# Patient Record
Sex: FEMALE | Race: WHITE | Employment: OTHER | URBAN - METROPOLITAN AREA
[De-identification: names, ages, dates, MRNs, and addresses within clinical notes are randomized per-mention and may not be internally consistent; named-entity substitution may affect disease eponyms.]

---

## 2017-02-07 ENCOUNTER — TELEPHONE (OUTPATIENT)
Dept: INTERNAL MEDICINE CLINIC | Facility: CLINIC | Age: 77
End: 2017-02-07

## 2017-02-07 DIAGNOSIS — E05.90 HYPERTHYROIDISM: ICD-10-CM

## 2017-02-07 DIAGNOSIS — E55.9 VITAMIN D DEFICIENCY: Primary | ICD-10-CM

## 2017-02-07 DIAGNOSIS — Z00.00 ANNUAL PHYSICAL EXAM: ICD-10-CM

## 2017-02-07 NOTE — TELEPHONE ENCOUNTER
Pt has an kobi for Medicare Annual on 2/28 she would like to know if she needs labs, if so please please mail an order to her home she will be using [employer].  Please call pt and let her know [phone]    Tasked to nursing

## 2017-02-07 NOTE — TELEPHONE ENCOUNTER
Patient has medicare annual physical scheduled for 2/28/17. Labs pended per protocol for [facility]. Patient would like labs mailed to her and also a call back.     To Dr Alana Caban, please advise on labs

## 2017-02-07 NOTE — TELEPHONE ENCOUNTER
Lab order completed, nursing please call patient, let them know to complete the labs 12 hours fasting, to be done 7 days prior to their visit of possible    Printed copy in my outbox

## 2017-02-07 NOTE — TELEPHONE ENCOUNTER
Pt notified lab orders to be mailed to pt  For [facility] labs   To be done 7 days prior to kobi fasting needed - pt understands instructions

## 2017-02-18 ENCOUNTER — PRIOR ORIGINAL RECORDS (OUTPATIENT)
Dept: OTHER | Age: 77
End: 2017-02-18

## 2017-02-20 LAB
ABSOLUTE BASOPHILS: 25 CELLS/UL (ref 0–200)
ABSOLUTE EOSINOPHILS: 172 CELLS/UL (ref 15–500)
ABSOLUTE LYMPHOCYTES: 1107 CELLS/UL (ref 850–3900)
ABSOLUTE MONOCYTES: 245 CELLS/UL (ref 200–950)
ABSOLUTE NEUTROPHILS: 3352 CELLS/UL (ref 1500–7800)
ALBUMIN/GLOBULIN RATIO: 1.6 (CALC) (ref 1–2.5)
ALBUMIN: 4.3 G/DL (ref 3.6–5.1)
ALKALINE PHOSPHATASE: 75 U/L (ref 33–130)
ALT: 12 U/L (ref 6–29)
APPEARANCE: CLEAR
AST: 15 U/L (ref 10–35)
BASOPHILS: 0.5 %
BILIRUBIN, TOTAL: 0.6 MG/DL (ref 0.2–1.2)
BILIRUBIN: NEGATIVE
BUN: 15 MG/DL (ref 7–25)
CALCIUM: 9.4 MG/DL (ref 8.6–10.4)
CARBON DIOXIDE: 29 MMOL/L (ref 20–31)
CHLORIDE: 108 MMOL/L (ref 98–110)
CHOL/HDLC RATIO: 2.9 (CALC)
CHOLESTEROL, TOTAL: 167 MG/DL (ref 125–200)
COLOR: YELLOW
CREATININE: 0.79 MG/DL (ref 0.6–0.93)
EGFR IF AFRICN AM: 84 ML/MIN/1.73M2
EGFR IF NONAFRICN AM: 73 ML/MIN/1.73M2
EOSINOPHILS: 3.5 %
GLOBULIN: 2.7 G/DL (CALC) (ref 1.9–3.7)
GLUCOSE: 89 MG/DL (ref 65–99)
GLUCOSE: NEGATIVE
HDL CHOLESTEROL: 57 MG/DL
HEMATOCRIT: 44.1 % (ref 35–45)
HEMOGLOBIN: 14.2 G/DL (ref 11.7–15.5)
KETONES: NEGATIVE
LDL-CHOLESTEROL: 89 MG/DL (CALC)
LEUKOCYTE ESTERASE: NEGATIVE
LYMPHOCYTES: 22.6 %
MCH: 29.6 PG (ref 27–33)
MCHC: 32.3 G/DL (ref 32–36)
MCV: 91.8 FL (ref 80–100)
MONOCYTES: 5 %
MPV: 9.6 FL (ref 7.5–12.5)
NEUTROPHILS: 68.4 %
NITRITE: NEGATIVE
NON-HDL CHOLESTEROL: 110 MG/DL (CALC)
OCCULT BLOOD: NEGATIVE
PH: 7 (ref 5–8)
PLATELET COUNT: 201 THOUSAND/UL (ref 140–400)
POTASSIUM: 4.5 MMOL/L (ref 3.5–5.3)
PROTEIN, TOTAL: 7 G/DL (ref 6.1–8.1)
PROTEIN: NEGATIVE
RDW: 15 % (ref 11–15)
RED BLOOD CELL COUNT: 4.8 MILLION/UL (ref 3.8–5.1)
SODIUM: 143 MMOL/L (ref 135–146)
SPECIFIC GRAVITY: 1.02 (ref 1–1.03)
TRIGLYCERIDES: 104 MG/DL
TSH: 2.11 MIU/L (ref 0.4–4.5)
VITAMIN D, 25-OH, TOTAL: 33 NG/ML (ref 30–100)
WHITE BLOOD CELL COUNT: 4.9 THOUSAND/UL (ref 3.8–10.8)

## 2017-02-21 ENCOUNTER — TELEPHONE (OUTPATIENT)
Dept: INTERNAL MEDICINE CLINIC | Facility: CLINIC | Age: 77
End: 2017-02-21

## 2017-02-21 NOTE — TELEPHONE ENCOUNTER
Patient called back. Relayed Dr Lance Penn message and specific values requested. She verbalized understanding. Offered to mail labs, but she states she can get them when she comes for appt at the end of this month.

## 2017-02-21 NOTE — TELEPHONE ENCOUNTER
Nursing please call patient, tell her I reviewed her recent lab work done, through [facility] [place], things look good here, vitamin D level looks better, everything else looks controlled, no changes in medication from me, follow-up with me as last discussed.

## 2017-02-28 ENCOUNTER — OFFICE VISIT (OUTPATIENT)
Dept: INTERNAL MEDICINE CLINIC | Facility: CLINIC | Age: 77
End: 2017-02-28

## 2017-02-28 VITALS
BODY MASS INDEX: 29.23 KG/M2 | HEART RATE: 83 BPM | TEMPERATURE: 98 F | OXYGEN SATURATION: 98 % | WEIGHT: 165 LBS | SYSTOLIC BLOOD PRESSURE: 152 MMHG | DIASTOLIC BLOOD PRESSURE: 86 MMHG | HEIGHT: 63 IN

## 2017-02-28 DIAGNOSIS — R19.8 ABNORMAL BOWEL MOVEMENT: ICD-10-CM

## 2017-02-28 DIAGNOSIS — Z86.19 HISTORY OF LYME DISEASE: ICD-10-CM

## 2017-02-28 DIAGNOSIS — L73.1 INGROWN HAIR: ICD-10-CM

## 2017-02-28 DIAGNOSIS — R09.89 LABILE BLOOD PRESSURE: ICD-10-CM

## 2017-02-28 DIAGNOSIS — E04.1 THYROID NODULE: ICD-10-CM

## 2017-02-28 DIAGNOSIS — J01.11 ACUTE RECURRENT FRONTAL SINUSITIS: ICD-10-CM

## 2017-02-28 DIAGNOSIS — Z00.00 PHYSICAL EXAM: Primary | ICD-10-CM

## 2017-02-28 DIAGNOSIS — E05.90 HYPERTHYROIDISM: ICD-10-CM

## 2017-02-28 DIAGNOSIS — Z12.31 ENCOUNTER FOR SCREENING MAMMOGRAM FOR HIGH-RISK PATIENT: ICD-10-CM

## 2017-02-28 DIAGNOSIS — Z87.442 HISTORY OF NEPHROLITHIASIS: ICD-10-CM

## 2017-02-28 DIAGNOSIS — Z00.00 ENCOUNTER FOR ANNUAL HEALTH EXAMINATION: ICD-10-CM

## 2017-02-28 PROCEDURE — G0439 PPPS, SUBSEQ VISIT: HCPCS | Performed by: INTERNAL MEDICINE

## 2017-02-28 PROCEDURE — G0463 HOSPITAL OUTPT CLINIC VISIT: HCPCS | Performed by: INTERNAL MEDICINE

## 2017-02-28 PROCEDURE — 93005 ELECTROCARDIOGRAM TRACING: CPT | Performed by: INTERNAL MEDICINE

## 2017-02-28 PROCEDURE — 99214 OFFICE O/P EST MOD 30 MIN: CPT | Performed by: INTERNAL MEDICINE

## 2017-02-28 PROCEDURE — 93010 ELECTROCARDIOGRAM REPORT: CPT | Performed by: INTERNAL MEDICINE

## 2017-02-28 RX ORDER — METOPROLOL SUCCINATE 25 MG/1
12.5 TABLET, EXTENDED RELEASE ORAL DAILY
Qty: 45 TABLET | Refills: 3 | Status: SHIPPED | OUTPATIENT
Start: 2017-02-28 | End: 2017-10-05

## 2017-02-28 RX ORDER — CEFADROXIL 500 MG/1
500 CAPSULE ORAL 2 TIMES DAILY
Qty: 14 CAPSULE | Refills: 0 | Status: SHIPPED | OUTPATIENT
Start: 2017-02-28 | End: 2017-03-27 | Stop reason: ALTCHOICE

## 2017-02-28 RX ORDER — SACCHAROMYCES BOULARDII 250 MG
250 CAPSULE ORAL 2 TIMES DAILY
Qty: 30 CAPSULE | Refills: 0 | Status: SHIPPED | OUTPATIENT
Start: 2017-02-28 | End: 2018-05-04

## 2017-02-28 RX ORDER — FLUTICASONE PROPIONATE 50 MCG
2 SPRAY, SUSPENSION (ML) NASAL 2 TIMES DAILY
Qty: 2 BOTTLE | Refills: 1 | Status: SHIPPED | OUTPATIENT
Start: 2017-02-28 | End: 2017-04-12

## 2017-02-28 NOTE — PATIENT INSTRUCTIONS
1. Physical exam  Physical exam instruction: Improve diet and exercise  - EKG In-Office [85901] Sinus rhythm at 72 bpm, no ST-T wave changes, normal EKG. 2. Thyroid nodule  Stable, continue medications and monitoring    3.  Hyperthyroidism  Stable, siobhan Directives    SeekAlumni.no. org/publications/Documents/personal_dec. pdf  An information packet, including necessary form from the [employer] [address] website. http://www. idph.state. [place]/public/books/advin.htm  A link to the [place]

## 2017-02-28 NOTE — PROGRESS NOTES
Goldy Briones is a 68year old female who presents for a Medicare Annual Wellness visit.     Patient presents with:  Physical: Patient presents for medicare annual physical. Needs order for mammogram, walking in the house for active, no exercise, in the green 0-No    Have you fallen in the last 12 months?: 0-No    Do you accidently lose urine?: 0-No    Do you have difficulty seeing?: 0-No    Do you have any difficulty walking or getting up?: 0-No    Do you have any tripping hazards?: 0-No    Are you on multiple OCCULTSTOOL No flowsheet data found. Glaucoma Screening      Ophthalmology Visit Annually 3 mo ago    Bone Density Screening      Dexascan Every two years No results found for this or any previous visit. No flowsheet data found.     Pap and Pelvic      P flowsheet data found. Dilated Eye exam  Annually No flowsheet data found. No flowsheet data found. COPD      Spirometry Testing Annually No results found for this or any previous visit. No flowsheet data found.       ALLERGIES:   No Known Allergies Negative Chest pain and irregular heartbeat/palpitations. GI: Negative for Abdominal pain, constipation, diarrhea, heartburn, nausea and vomiting. : Negative for Dysuria and urinary frequency.   Endocrine: Negative for Cold intolerance and heat intolera atraumatic, normocephalic, ears and throat are clear                Hearing Assessed via: Questionnaire  EYES: PERRLA, EOMI, conjunctiva are clear  Right Eye Visual Acuity: Corrected Left Eye Visual Acuity: Corrected   Right Eye Chart Acuity: 20/20 Left Ey 1    7. Encounter for annual health examination  Stable, continue medications and monitoring    8.  Labile blood pressure  Try the new medication, one half tablet, this should work well, continue to monitor, calling you with any side effects  - Metoprolol S Directives. The patient indicates understanding of these issues and agrees to the plan.   The patient is asked to return in 1 year for full physical.    SUGGESTED VACCINATIONS - Influenza, Pneumococcal, Zoster, Tetanus     Immunization History   Administ

## 2017-03-03 ENCOUNTER — TELEPHONE (OUTPATIENT)
Dept: INTERNAL MEDICINE CLINIC | Facility: CLINIC | Age: 77
End: 2017-03-03

## 2017-03-03 NOTE — TELEPHONE ENCOUNTER
Relayed to pt regarding abnormal mammogram and that [facility] will be calling her for additional views--pt verbalized understanding.  Order faxed back to [facility]

## 2017-03-03 NOTE — TELEPHONE ENCOUNTER
Mammogram reviewed, this is abnormal they are we calling her for additional views, nursing did speak with her after the physical copy of the mammogram was notated, and sent to nursing for phone call follow-up, brittaney to document

## 2017-03-10 ENCOUNTER — TELEPHONE (OUTPATIENT)
Dept: INTERNAL MEDICINE CLINIC | Facility: CLINIC | Age: 77
End: 2017-03-10

## 2017-03-10 DIAGNOSIS — R92.8 ABNORMAL MAMMOGRAM: Primary | ICD-10-CM

## 2017-03-10 NOTE — TELEPHONE ENCOUNTER
LMTCB---(No HIPAA info listed for leaving messages)  When patient calls back please mail order for diagnostic mammogram to her

## 2017-03-15 NOTE — TELEPHONE ENCOUNTER
Called patient and relayed DR. GILES message - verbalized understanding - order for diagnostic mammogram mailed to patients home

## 2017-03-27 ENCOUNTER — TELEPHONE (OUTPATIENT)
Dept: INTERNAL MEDICINE CLINIC | Facility: CLINIC | Age: 77
End: 2017-03-27

## 2017-03-27 RX ORDER — CEFUROXIME AXETIL 500 MG/1
500 TABLET ORAL 2 TIMES DAILY
Qty: 20 TABLET | Refills: 0 | Status: SHIPPED | OUTPATIENT
Start: 2017-03-27 | End: 2017-12-13

## 2017-03-27 NOTE — TELEPHONE ENCOUNTER
The nose spray prescribed on 2/28 helps for a couple of days & then it stops helping. Patient has headache, post nasal drip, cough from post nasal drip. Head congestion. No temp. Patient wants an antibiotic sent to [address] [place] [address] in [place].     She can be r

## 2017-04-10 ENCOUNTER — TELEPHONE (OUTPATIENT)
Dept: INTERNAL MEDICINE CLINIC | Facility: CLINIC | Age: 77
End: 2017-04-10

## 2017-04-10 DIAGNOSIS — J01.11 ACUTE RECURRENT FRONTAL SINUSITIS: Primary | ICD-10-CM

## 2017-04-10 NOTE — TELEPHONE ENCOUNTER
Patient needs a refill for:    Flonase     Send it to [address] (she had it recently filled at a local pharmacy)

## 2017-04-12 RX ORDER — FLUTICASONE PROPIONATE 50 MCG
2 SPRAY, SUSPENSION (ML) NASAL 2 TIMES DAILY
Qty: 3 BOTTLE | Refills: 0 | Status: SHIPPED | OUTPATIENT
Start: 2017-04-12 | End: 2018-05-10

## 2017-04-17 NOTE — TELEPHONE ENCOUNTER
The usual dose is 1 spray  In each nostril twice daily or 2 sprays in each once daily  Please advise different directions  To Rx and in purple folder

## 2017-04-21 NOTE — TELEPHONE ENCOUNTER
PA approved for Rx Fluticasone propionate spray  Effective till 12/31/2099  To Rx and sent to scanning

## 2017-09-11 ENCOUNTER — TELEPHONE (OUTPATIENT)
Dept: INTERNAL MEDICINE CLINIC | Facility: CLINIC | Age: 77
End: 2017-09-11

## 2017-09-11 NOTE — TELEPHONE ENCOUNTER
Nursing let her know, if she saw me in the office I would be sending her to see Dr. Sosa Ward anyway, so it is a great idea to see him, stay on the medication as she has, and he can give her the next option about what to do there.   Fax over whatever cardiac EKG

## 2017-09-11 NOTE — TELEPHONE ENCOUNTER
Patient called back. When she went to see Dr Augusto Street her thyroid doctor he had told her to wait a month and then try stopping the metoprolol to see if she had symptoms again.   She reports she stopped it around the 30th of August and she started having sympt

## 2017-09-11 NOTE — TELEPHONE ENCOUNTER
Re:  Patient's thyroid & labs with Dr. Nat Hester. Lab result were sent to us sometime after 9/8. Patient wants to talk to a nurse about her thyroid problem & what the doctor needs.

## 2017-09-15 NOTE — TELEPHONE ENCOUNTER
Called patient and relayed DR. GILES message - verbalized understanding . EKG and recent labs faxed to DR. León Monsivais at [phone]

## 2017-09-18 LAB
ALBUMIN: 4.3 G/DL
ALKALINE PHOSPHATATE(ALK PHOS): 75 IU/L
ALT (SGPT): 12 U/L
AST (SGOT): 15 U/L
BILIRUBIN TOTAL: 0.6 MG/DL
BUN: 15 MG/DL
CALCIUM: 9.4 MG/DL
CHLORIDE: 108 MEQ/L
CHOLESTEROL, TOTAL: 157 MG/DL
CREATININE, SERUM: 0.79 MG/DL
GLOBULIN: 2.7 G/DL
GLUCOSE: 89 MG/DL
GLUCOSE: 89 MG/DL
HDL CHOLESTEROL: 57 MG/DL
HEMATOCRIT: 44.1 %
HEMOGLOBIN: 14.2 G/DL
LDL CHOLESTEROL: 104 MG/DL
NON-HDL CHOLESTEROL: 110 MG/DL
PLATELETS: 201 K/UL
POTASSIUM, SERUM: 4.5 MEQ/L
PROTEIN, TOTAL: 7 G/DL
RED BLOOD COUNT: 4.8 X 10-6/U
SGOT (AST): 15 IU/L
SGPT (ALT): 12 IU/L
SODIUM: 143 MEQ/L
THYROID STIMULATING HORMONE: 2.11 MLU/L
TRIGLYCERIDES: 104 MG/DL
VITAMIN D 25-OH: 33 NG/ML
WHITE BLOOD COUNT: 4.9 X 10-3/U

## 2017-10-04 ENCOUNTER — MYAURORA ACCOUNT LINK (OUTPATIENT)
Dept: OTHER | Age: 77
End: 2017-10-04

## 2017-10-04 ENCOUNTER — TELEPHONE (OUTPATIENT)
Dept: INTERNAL MEDICINE CLINIC | Facility: CLINIC | Age: 77
End: 2017-10-04

## 2017-10-04 ENCOUNTER — PRIOR ORIGINAL RECORDS (OUTPATIENT)
Dept: OTHER | Age: 77
End: 2017-10-04

## 2017-10-04 DIAGNOSIS — R92.8 ABNORMAL MAMMOGRAM: Primary | ICD-10-CM

## 2017-10-04 NOTE — TELEPHONE ENCOUNTER
Received a phone call from Cristofer at [facility]. States patient presented for a repeat follow up mammogram with a written order.  Written order by Sai Walters stated bilateral mammogram. Brian Diaz states based on the patients last bilateral mammogram

## 2017-10-05 ENCOUNTER — TELEPHONE (OUTPATIENT)
Dept: INTERNAL MEDICINE CLINIC | Facility: CLINIC | Age: 77
End: 2017-10-05

## 2017-10-05 DIAGNOSIS — R09.89 LABILE BLOOD PRESSURE: ICD-10-CM

## 2017-10-05 RX ORDER — METOPROLOL SUCCINATE 25 MG/1
12.5 TABLET, EXTENDED RELEASE ORAL DAILY
Qty: 15 TABLET | Refills: 0 | Status: SHIPPED | OUTPATIENT
Start: 2017-10-05 | End: 2018-05-04

## 2017-10-05 NOTE — TELEPHONE ENCOUNTER
Toprol XL 25 mg, 1/2 a tablet a day, QTY-30. Please send to [facility] in [place]. Pt is out of medication. The mail order delayed the shipment.       Tasked low to Rx

## 2017-10-10 ENCOUNTER — TELEPHONE (OUTPATIENT)
Dept: INTERNAL MEDICINE CLINIC | Facility: CLINIC | Age: 77
End: 2017-10-10

## 2017-10-10 NOTE — TELEPHONE ENCOUNTER
Nursing please call patient, you can let her know I reviewed the mammogram results from [facility], things look good here, there does seem to be some mild cystic abnormalities but they seem very stable, the radiologist is not concerned recommends ro

## 2017-10-10 NOTE — TELEPHONE ENCOUNTER
I called patient and relayed Dr Hui Fernandez message to her.  Patient verbalized understanding but states that while at [facility] they told her to come back in 1 year initially but then said 6 months because the last time she had a bilateral mammogram

## 2017-10-17 NOTE — TELEPHONE ENCOUNTER
She should be performing the mammogram one year from the original screening mammogram, that may actually be 6 months from now. Tell her she is right. Also good luck with the upcoming testing with the cardiologist, everything sounds reasonable here.

## 2017-11-01 ENCOUNTER — PRIOR ORIGINAL RECORDS (OUTPATIENT)
Dept: OTHER | Age: 77
End: 2017-11-01

## 2017-11-08 ENCOUNTER — PRIOR ORIGINAL RECORDS (OUTPATIENT)
Dept: OTHER | Age: 77
End: 2017-11-08

## 2017-11-27 ENCOUNTER — PRIOR ORIGINAL RECORDS (OUTPATIENT)
Dept: OTHER | Age: 77
End: 2017-11-27

## 2017-11-27 ENCOUNTER — MYAURORA ACCOUNT LINK (OUTPATIENT)
Dept: OTHER | Age: 77
End: 2017-11-27

## 2017-12-13 ENCOUNTER — TELEPHONE (OUTPATIENT)
Dept: INTERNAL MEDICINE CLINIC | Facility: CLINIC | Age: 77
End: 2017-12-13

## 2017-12-13 RX ORDER — CEFUROXIME AXETIL 250 MG/1
250 TABLET ORAL 2 TIMES DAILY
Qty: 20 TABLET | Refills: 0 | Status: SHIPPED | OUTPATIENT
Start: 2017-12-13 | End: 2018-05-04 | Stop reason: ALTCHOICE

## 2017-12-13 NOTE — TELEPHONE ENCOUNTER
[phone]  Poss sinus inf. Sick for a week.  Pt would like Rx  [facility] in [place]  To clinical

## 2017-12-13 NOTE — TELEPHONE ENCOUNTER
Called patient and relayed Dr Macedo Pi message to her. She verbalized understanding. Upon sending [address] sent for Ceftin to [facility] was given high warning.      High  High Dose: Cefuroxime Axetil, 500 mg, Oral, 2 times daily   Single dose of 500 mg ex

## 2017-12-13 NOTE — TELEPHONE ENCOUNTER
Called patient for more information on her symptoms. She reports she has had symptoms for a week: Light headedness, pain in forehead, teeth, and nose. She coughs from the irritation in her throat. She has green/bloody nasal congestion.  She has not checked

## 2017-12-13 NOTE — TELEPHONE ENCOUNTER
To nursing, ok to refill the ceftin 500 mg bid #20 that Dr José Miguel Rahman prescribed in March of this yr. If not better, she will need to be seen. Thanks.

## 2018-03-05 ENCOUNTER — TELEPHONE (OUTPATIENT)
Dept: INTERNAL MEDICINE CLINIC | Facility: CLINIC | Age: 78
End: 2018-03-05

## 2018-03-05 DIAGNOSIS — R92.8 ABNORMAL MAMMOGRAM: Primary | ICD-10-CM

## 2018-03-06 NOTE — TELEPHONE ENCOUNTER
Left VM with pt to inform her that the Mammogram order is in the system. Told pt to please call back to schedule.

## 2018-03-06 NOTE — TELEPHONE ENCOUNTER
Pt would like the Mammogram order be for [facility], please mail to pt home    Tasked to nursing

## 2018-03-19 DIAGNOSIS — R09.89 LABILE BLOOD PRESSURE: ICD-10-CM

## 2018-03-19 RX ORDER — METOPROLOL SUCCINATE 25 MG
TABLET, EXTENDED RELEASE 24 HR ORAL
Qty: 45 TABLET | Refills: 0 | Status: SHIPPED | OUTPATIENT
Start: 2018-03-19 | End: 2018-06-17

## 2018-05-04 ENCOUNTER — OFFICE VISIT (OUTPATIENT)
Dept: INTERNAL MEDICINE CLINIC | Facility: CLINIC | Age: 78
End: 2018-05-04

## 2018-05-04 VITALS
RESPIRATION RATE: 18 BRPM | HEART RATE: 61 BPM | WEIGHT: 156 LBS | HEIGHT: 63 IN | DIASTOLIC BLOOD PRESSURE: 88 MMHG | BODY MASS INDEX: 27.64 KG/M2 | SYSTOLIC BLOOD PRESSURE: 148 MMHG | OXYGEN SATURATION: 98 % | TEMPERATURE: 98 F

## 2018-05-04 DIAGNOSIS — Z87.442 HISTORY OF NEPHROLITHIASIS: ICD-10-CM

## 2018-05-04 DIAGNOSIS — E04.1 THYROID NODULE: ICD-10-CM

## 2018-05-04 DIAGNOSIS — J01.11 ACUTE RECURRENT FRONTAL SINUSITIS: ICD-10-CM

## 2018-05-04 DIAGNOSIS — Z86.19 HISTORY OF LYME DISEASE: ICD-10-CM

## 2018-05-04 DIAGNOSIS — R19.8 ABNORMAL BOWEL MOVEMENT: ICD-10-CM

## 2018-05-04 DIAGNOSIS — Z00.00 PHYSICAL EXAM: Primary | ICD-10-CM

## 2018-05-04 DIAGNOSIS — Z00.00 ENCOUNTER FOR ANNUAL HEALTH EXAMINATION: ICD-10-CM

## 2018-05-04 DIAGNOSIS — E05.90 HYPERTHYROIDISM: ICD-10-CM

## 2018-05-04 DIAGNOSIS — E55.9 VITAMIN D DEFICIENCY: ICD-10-CM

## 2018-05-04 DIAGNOSIS — I10 ESSENTIAL HYPERTENSION: ICD-10-CM

## 2018-05-04 PROCEDURE — 99214 OFFICE O/P EST MOD 30 MIN: CPT | Performed by: INTERNAL MEDICINE

## 2018-05-04 PROCEDURE — G0463 HOSPITAL OUTPT CLINIC VISIT: HCPCS | Performed by: INTERNAL MEDICINE

## 2018-05-04 PROCEDURE — G0439 PPPS, SUBSEQ VISIT: HCPCS | Performed by: INTERNAL MEDICINE

## 2018-05-04 RX ORDER — SACCHAROMYCES BOULARDII 250 MG
250 CAPSULE ORAL 2 TIMES DAILY
Qty: 180 CAPSULE | Refills: 1 | Status: SHIPPED | OUTPATIENT
Start: 2018-05-04 | End: 2019-03-22

## 2018-05-04 NOTE — PROGRESS NOTES
Nancy Negron is a 68year old female who presents for a Medicare Annual Wellness visit.     Patient presents with:  Physical: Annual Medicare Px, may be leaving for a wedding in [place], stable and doing well, diet average and exercise is just activity 0          Depression Screening (PHQ-2/PHQ-9): Over the LAST 2 WEEKS   Little interest or pleasure in doing things (over the last two weeks)?: Not at all    Feeling down, depressed, or hopeless (over the last two weeks)?: Not at all    PHQ-2 SCORE: 0 for this patient. Update Health Maintenance if applicable    Chlamydia  Annually if high risk No results found for: CHLAMYDIA No flowsheet data found.     Screening Mammogram      Mammogram  Annually There are no preventive care reminders to display for [facility] needed. Disp:  Rfl:    POTASSIUM OR Take by mouth. Twice a week Disp:  Rfl:    saccharomyces boulardii 250 MG Oral Cap Take 1 capsule (250 mg total) by mouth 2 (two) times daily.  Disp: 180 capsule Rfl: 1   TOPROL XL 25 MG Oral Tablet 24 Hr TAKE ONE-HALF (1 disturbance and memory impairment. Psych: Negative for Anxiety and depression. Integumentary: Negative for Hives and rash. MS: Negative muscle weakness. pos for joint pain  Hema/Lymph: Negative Easy bleeding and easy bruising.   Allergic/Immuno: Negative OTHER RELEVANT CHRONIC CONDITIONS:   Maikol Mcfadden is a 68year old female who presents for a Medicare Assessment. PLAN SUMMARY:   Diagnoses and all orders for this visit:    Physical exam  -     CBC WITH DIFFERENTIAL WITH PLATELET;  Future  -     COMP verbalized understanding    Recommended Websites for Advanced Directives    SeekAlumni.no. org/publications/Documents/personal_dec. pdf  An information packet, including necessary form from the [employer] website. http://www. idph. st

## 2018-05-08 NOTE — TELEPHONE ENCOUNTER
Nursing please call patient, tell her that I reviewed the mammogram done on the fourth, everything looks good here, repeat in one year.

## 2018-05-10 DIAGNOSIS — J01.11 ACUTE RECURRENT FRONTAL SINUSITIS: ICD-10-CM

## 2018-05-11 RX ORDER — FLUTICASONE PROPIONATE 50 MCG
SPRAY, SUSPENSION (ML) NASAL
Qty: 3 BOTTLE | Refills: 3 | Status: SHIPPED | OUTPATIENT
Start: 2018-05-11 | End: 2019-07-23

## 2018-06-17 DIAGNOSIS — R09.89 LABILE BLOOD PRESSURE: ICD-10-CM

## 2018-06-17 RX ORDER — METOPROLOL SUCCINATE 25 MG
TABLET, EXTENDED RELEASE 24 HR ORAL
Qty: 45 TABLET | Refills: 0 | Status: SHIPPED | OUTPATIENT
Start: 2018-06-17 | End: 2018-08-30

## 2018-07-24 ENCOUNTER — TELEPHONE (OUTPATIENT)
Dept: INTERNAL MEDICINE CLINIC | Facility: CLINIC | Age: 78
End: 2018-07-24

## 2018-07-24 ENCOUNTER — LAB ENCOUNTER (OUTPATIENT)
Dept: LAB | Age: 78
End: 2018-07-24
Attending: INTERNAL MEDICINE
Payer: MEDICARE

## 2018-07-24 DIAGNOSIS — R30.0 DYSURIA: Primary | ICD-10-CM

## 2018-07-24 DIAGNOSIS — R35.0 URINARY FREQUENCY: ICD-10-CM

## 2018-07-24 LAB
BILIRUB UR QL: NEGATIVE
COLOR UR: YELLOW
GLUCOSE UR-MCNC: NEGATIVE MG/DL
KETONES UR-MCNC: NEGATIVE MG/DL
NITRITE UR QL STRIP.AUTO: NEGATIVE
PH UR: 5 [PH] (ref 5–8)
PROT UR-MCNC: 100 MG/DL
RBC #/AREA URNS AUTO: 461 /HPF
SP GR UR STRIP: 1.02 (ref 1–1.03)
UROBILINOGEN UR STRIP-ACNC: 2
VIT C UR-MCNC: 40 MG/DL
WBC #/AREA URNS AUTO: 737 /HPF

## 2018-07-24 PROCEDURE — 87086 URINE CULTURE/COLONY COUNT: CPT

## 2018-07-24 PROCEDURE — 81001 URINALYSIS AUTO W/SCOPE: CPT

## 2018-07-24 RX ORDER — NITROFURANTOIN 25; 75 MG/1; MG/1
100 CAPSULE ORAL 2 TIMES DAILY
Qty: 14 CAPSULE | Refills: 0 | Status: SHIPPED | OUTPATIENT
Start: 2018-07-24 | End: 2019-03-22 | Stop reason: ALTCHOICE

## 2018-07-24 NOTE — TELEPHONE ENCOUNTER
To Dr. Debby Hart - see below - Sx onset: yesterday - Sx: burning with frequent urination. Labs pended above.

## 2018-07-24 NOTE — TELEPHONE ENCOUNTER
Pt thinks she has a UTI, wondering if she can do a urine test in the lab to check for this. Pt is brining her  in today to see Dr GILES at 11:45. Was hoping to get it done at the same time. Best call back is [phone]. Tasked to nursing.

## 2018-07-26 ENCOUNTER — TELEPHONE (OUTPATIENT)
Dept: INTERNAL MEDICINE CLINIC | Facility: CLINIC | Age: 78
End: 2018-07-26

## 2018-07-27 NOTE — TELEPHONE ENCOUNTER
Nursing, please call patient, let her know that I did review the urine culture, there was no distinct infection here, but still I want her to complete all the antibiotics and follow-up with me as last discussed. Make sure symptomatic that she is better.

## 2018-07-27 NOTE — TELEPHONE ENCOUNTER
Routed to Dr. Eligio Mitchell as FYI     Pt voiced understanding to message relayed below. She reports the burning has subsided and she is feeling much better.

## 2018-08-30 ENCOUNTER — TELEPHONE (OUTPATIENT)
Dept: INTERNAL MEDICINE CLINIC | Facility: CLINIC | Age: 78
End: 2018-08-30

## 2018-08-30 DIAGNOSIS — R09.89 LABILE BLOOD PRESSURE: ICD-10-CM

## 2018-08-30 RX ORDER — METOPROLOL SUCCINATE 25 MG/1
25 TABLET, EXTENDED RELEASE ORAL DAILY
Qty: 30 TABLET | Refills: 0 | Status: SHIPPED | OUTPATIENT
Start: 2018-08-30 | End: 2018-09-26

## 2018-08-30 NOTE — TELEPHONE ENCOUNTER
Pt requesting refill Toprol XL 25 mg, pt said it was increased to 1 tab daily  Pt is completely out and needs temporary supply [facility]  Please send the remaining script to [facility]  Call pt when complete [phone]    Tasked to rx

## 2018-08-30 NOTE — TELEPHONE ENCOUNTER
To Dr. Eligio Mitchell - see below, pended above. Pt took last pill today. Did not see documentation of increase to 1 tablet daily, pt states was increased at last office visit. Pt would like 30 day supply to [facility], then long term fill to [facility].

## 2018-09-26 RX ORDER — METOPROLOL SUCCINATE 25 MG/1
25 TABLET, EXTENDED RELEASE ORAL DAILY
Qty: 30 TABLET | Refills: 0 | Status: SHIPPED | OUTPATIENT
Start: 2018-09-26 | End: 2019-01-23

## 2018-09-26 RX ORDER — METOPROLOL SUCCINATE 25 MG/1
25 TABLET, EXTENDED RELEASE ORAL DAILY
Qty: 90 TABLET | Refills: 3 | Status: SHIPPED | OUTPATIENT
Start: 2018-09-26 | End: 2020-10-22

## 2018-09-26 NOTE — TELEPHONE ENCOUNTER
[id number] Barbara Crandall one more time asking to please send a 30 day supplies to [address] [place] [address] for Toprol XL 25mg and once again please send a year supplies to [facility].  Pt stated this should have been done lest time this med was requested she like this done tod

## 2018-09-26 NOTE — TELEPHONE ENCOUNTER
Refill request is for a maintenance medication and has met the criteria specified in the Ambulatory Medication Refill Standing Order for eligibility, visits, laboratory, alerts and was sent to the requested pharmacy.   short term supply sent to local pharma

## 2018-10-25 ENCOUNTER — TELEPHONE (OUTPATIENT)
Dept: INTERNAL MEDICINE CLINIC | Facility: CLINIC | Age: 78
End: 2018-10-25

## 2018-10-25 NOTE — TELEPHONE ENCOUNTER
Pt has a question about the pneumococcal vaccine, wants to find out about the new shot with booster or is the new shot the same way as before?  Best call back is [phone]

## 2019-01-23 DIAGNOSIS — R09.89 LABILE BLOOD PRESSURE: ICD-10-CM

## 2019-01-23 RX ORDER — METOPROLOL SUCCINATE 25 MG/1
25 TABLET, EXTENDED RELEASE ORAL DAILY
Qty: 30 TABLET | Refills: 0 | Status: SHIPPED | OUTPATIENT
Start: 2019-01-23 | End: 2019-12-10 | Stop reason: ALTCHOICE

## 2019-01-23 NOTE — TELEPHONE ENCOUNTER
S/w patient and medication dose/sig confirmed. #30 sent to local pharmacy. I inquired about labs that were ordered 5/2018. Patient states she had them done at [facility] and they were supposed to be faxed. Result not found in media tab.  Pt will have [facility] fax r

## 2019-01-23 NOTE — TELEPHONE ENCOUNTER
Pt. Needs a short fill on Metoprolol XL she never received her mail order send to [facility]  Ph. # [phone]   Routed to Rx

## 2019-02-28 VITALS
HEART RATE: 64 BPM | WEIGHT: 157 LBS | BODY MASS INDEX: 26.8 KG/M2 | SYSTOLIC BLOOD PRESSURE: 135 MMHG | DIASTOLIC BLOOD PRESSURE: 76 MMHG | HEIGHT: 64 IN | RESPIRATION RATE: 18 BRPM

## 2019-02-28 VITALS
HEIGHT: 64 IN | HEART RATE: 75 BPM | BODY MASS INDEX: 26.8 KG/M2 | WEIGHT: 157 LBS | DIASTOLIC BLOOD PRESSURE: 78 MMHG | SYSTOLIC BLOOD PRESSURE: 130 MMHG | RESPIRATION RATE: 18 BRPM

## 2019-03-22 ENCOUNTER — OFFICE VISIT (OUTPATIENT)
Dept: INTERNAL MEDICINE CLINIC | Facility: CLINIC | Age: 79
End: 2019-03-22
Payer: MEDICARE

## 2019-03-22 VITALS
OXYGEN SATURATION: 97 % | DIASTOLIC BLOOD PRESSURE: 80 MMHG | HEART RATE: 64 BPM | HEIGHT: 63 IN | BODY MASS INDEX: 28.35 KG/M2 | TEMPERATURE: 98 F | SYSTOLIC BLOOD PRESSURE: 178 MMHG | WEIGHT: 160 LBS

## 2019-03-22 DIAGNOSIS — I10 ESSENTIAL HYPERTENSION: ICD-10-CM

## 2019-03-22 DIAGNOSIS — N30.00 ACUTE CYSTITIS WITHOUT HEMATURIA: ICD-10-CM

## 2019-03-22 DIAGNOSIS — N20.0 NEPHROLITHIASIS: Primary | ICD-10-CM

## 2019-03-22 PROCEDURE — G0463 HOSPITAL OUTPT CLINIC VISIT: HCPCS | Performed by: INTERNAL MEDICINE

## 2019-03-22 PROCEDURE — 99214 OFFICE O/P EST MOD 30 MIN: CPT | Performed by: INTERNAL MEDICINE

## 2019-03-22 RX ORDER — CEPHALEXIN 500 MG/1
1 CAPSULE ORAL 2 TIMES DAILY
Refills: 0 | COMMUNITY
Start: 2019-03-21 | End: 2019-12-10 | Stop reason: ALTCHOICE

## 2019-03-22 RX ORDER — TRAMADOL HYDROCHLORIDE 50 MG/1
1 TABLET ORAL 3 TIMES DAILY
Refills: 0 | COMMUNITY
Start: 2019-03-21 | End: 2019-12-10 | Stop reason: ALTCHOICE

## 2019-03-22 NOTE — PROGRESS NOTES
HPI:   Mayuri Razo is a 66year old female who presents for complains of: Patient presents with:  ER F/U: went to ER at [facility] on Wednesday. CC: pain to right side and urinary sx. dx: UTI and RT kidney stone.  1st dose abx taken yesterday for

## 2019-03-22 NOTE — PATIENT INSTRUCTIONS
1. Nephrolithiasis  See the urologist and call if not better, you will need testing  - UROLOGY - INTERNAL    2. Essential hypertension  Cont meds    3.  Acute cystitis without hematuria  Cont meds and complete

## 2019-03-25 ENCOUNTER — TELEPHONE (OUTPATIENT)
Dept: INTERNAL MEDICINE CLINIC | Facility: CLINIC | Age: 79
End: 2019-03-25

## 2019-03-25 NOTE — TELEPHONE ENCOUNTER
Pt was seen past Friday by  follow up from ER pt was put on Cethalexin 500 since then they c hanged it to Nitrofurantoin 100/manoMCR

## 2019-03-25 NOTE — TELEPHONE ENCOUNTER
Called patient who states that the ER switched her medication after results of urine culture came back.  Lab order for repeat UA and culture mailed to patients home , since [facility]

## 2019-06-06 ENCOUNTER — HOSPITAL ENCOUNTER (OUTPATIENT)
Dept: ULTRASOUND IMAGING | Facility: HOSPITAL | Age: 79
Discharge: HOME | End: 2019-06-06
Attending: UROLOGY
Payer: MEDICARE

## 2019-06-06 DIAGNOSIS — N20.0 URIC ACID NEPHROLITHIASIS: ICD-10-CM

## 2019-06-06 PROCEDURE — 76770 US EXAM ABDO BACK WALL COMP: CPT | Performed by: UROLOGY

## 2019-07-23 ENCOUNTER — TELEPHONE (OUTPATIENT)
Dept: INTERNAL MEDICINE CLINIC | Facility: CLINIC | Age: 79
End: 2019-07-23

## 2019-07-23 DIAGNOSIS — J01.11 ACUTE RECURRENT FRONTAL SINUSITIS: ICD-10-CM

## 2019-07-31 NOTE — TELEPHONE ENCOUNTER
Routed to Dr. Claudy Casey for approval on refill requests. Last labs 2/18/17. Last PE 5/4/18. Ok to fill?  If so, how many tablets/RF's?

## 2019-07-31 NOTE — TELEPHONE ENCOUNTER
Patient called back. Her  is being treated for cancer 60 miles away at least 3x/week. Patient does not have any time to make an appointment for an office visit.     I explained that we had gotten refilll requests for her & that I would give this information back to Dr. Gideon Wong.    Patient can be reached at:  [phone]

## 2019-08-01 RX ORDER — FLUTICASONE PROPIONATE 50 MCG
SPRAY, SUSPENSION (ML) NASAL
Qty: 48 G | Refills: 3 | Status: SHIPPED | OUTPATIENT
Start: 2019-08-01 | End: 2020-08-04

## 2019-08-01 RX ORDER — METOPROLOL SUCCINATE 25 MG
TABLET, EXTENDED RELEASE 24 HR ORAL
Qty: 90 TABLET | Refills: 3 | Status: SHIPPED | OUTPATIENT
Start: 2019-08-01 | End: 2019-12-10 | Stop reason: ALTCHOICE

## 2019-08-02 NOTE — TELEPHONE ENCOUNTER
She has been in a lot lately with her , but nursing please encourage her to make an appointment with me for a yearly check and/Medicare physical in the near future, I did refill her medications

## 2019-08-02 NOTE — TELEPHONE ENCOUNTER
Pt returned call & was   Advised refills were authorized  And that Dr GILES would like to see her    She will call back to make an appt

## 2019-08-22 ENCOUNTER — LAB ENCOUNTER (OUTPATIENT)
Dept: LAB | Facility: HOSPITAL | Age: 79
End: 2019-08-22
Attending: INTERNAL MEDICINE
Payer: MEDICARE

## 2019-08-22 DIAGNOSIS — N20.0 NEPHROLITHIASIS: Primary | ICD-10-CM

## 2019-08-22 LAB
ALBUMIN SERPL-MCNC: 3.9 G/DL (ref 3.4–5)
ANION GAP SERPL CALC-SCNC: 9 MMOL/L (ref 0–18)
BACTERIA UR QL AUTO: NEGATIVE /HPF
BASOPHILS # BLD AUTO: 0.04 X10(3) UL (ref 0–0.2)
BASOPHILS NFR BLD AUTO: 0.9 %
BILIRUB UR QL: NEGATIVE
BUN BLD-MCNC: 18 MG/DL (ref 7–18)
BUN/CREAT SERPL: 23.1 (ref 10–20)
CALCIUM BLD-MCNC: 9.6 MG/DL (ref 8.5–10.1)
CHLORIDE SERPL-SCNC: 110 MMOL/L (ref 98–112)
CO2 SERPL-SCNC: 27 MMOL/L (ref 21–32)
COLOR UR: YELLOW
CREAT BLD-MCNC: 0.78 MG/DL (ref 0.55–1.02)
DEPRECATED RDW RBC AUTO: 47.1 FL (ref 35.1–46.3)
EOSINOPHIL # BLD AUTO: 0.15 X10(3) UL (ref 0–0.7)
EOSINOPHIL NFR BLD AUTO: 3.3 %
ERYTHROCYTE [DISTWIDTH] IN BLOOD BY AUTOMATED COUNT: 13.8 % (ref 11–15)
GLUCOSE BLD-MCNC: 84 MG/DL (ref 70–99)
GLUCOSE UR-MCNC: NEGATIVE MG/DL
HCT VFR BLD AUTO: 43.6 % (ref 35–48)
HGB BLD-MCNC: 14.4 G/DL (ref 12–16)
HGB UR QL STRIP.AUTO: NEGATIVE
IMM GRANULOCYTES # BLD AUTO: 0.01 X10(3) UL (ref 0–1)
IMM GRANULOCYTES NFR BLD: 0.2 %
KETONES UR-MCNC: NEGATIVE MG/DL
LYMPHOCYTES # BLD AUTO: 1.45 X10(3) UL (ref 1–4)
LYMPHOCYTES NFR BLD AUTO: 31.7 %
MCH RBC QN AUTO: 30.4 PG (ref 26–34)
MCHC RBC AUTO-ENTMCNC: 33 G/DL (ref 31–37)
MCV RBC AUTO: 92.2 FL (ref 80–100)
MONOCYTES # BLD AUTO: 0.39 X10(3) UL (ref 0.1–1)
MONOCYTES NFR BLD AUTO: 8.5 %
NEUTROPHILS # BLD AUTO: 2.53 X10 (3) UL (ref 1.5–7.7)
NEUTROPHILS # BLD AUTO: 2.53 X10(3) UL (ref 1.5–7.7)
NEUTROPHILS NFR BLD AUTO: 55.4 %
NITRITE UR QL STRIP.AUTO: NEGATIVE
OSMOLALITY SERPL CALC.SUM OF ELEC: 303 MOSM/KG (ref 275–295)
PH UR: 6 [PH] (ref 5–8)
PHOSPHATE SERPL-MCNC: 3.2 MG/DL (ref 2.5–4.9)
PLATELET # BLD AUTO: 207 10(3)UL (ref 150–450)
POTASSIUM SERPL-SCNC: 3.9 MMOL/L (ref 3.5–5.1)
PROT UR-MCNC: NEGATIVE MG/DL
RBC # BLD AUTO: 4.73 X10(6)UL (ref 3.8–5.3)
RBC #/AREA URNS AUTO: 1 /HPF
SODIUM SERPL-SCNC: 146 MMOL/L (ref 136–145)
SP GR UR STRIP: 1.02 (ref 1–1.03)
UROBILINOGEN UR STRIP-ACNC: <2
VIT C UR-MCNC: 40 MG/DL
WBC # BLD AUTO: 4.6 X10(3) UL (ref 4–11)
WBC #/AREA URNS AUTO: 5 /HPF

## 2019-08-22 PROCEDURE — 81001 URINALYSIS AUTO W/SCOPE: CPT

## 2019-08-22 PROCEDURE — 85025 COMPLETE CBC W/AUTO DIFF WBC: CPT

## 2019-08-22 PROCEDURE — 36415 COLL VENOUS BLD VENIPUNCTURE: CPT

## 2019-08-22 PROCEDURE — 80069 RENAL FUNCTION PANEL: CPT

## 2019-10-09 ENCOUNTER — APPOINTMENT (OUTPATIENT)
Dept: LAB | Facility: HOSPITAL | Age: 79
End: 2019-10-09
Attending: INTERNAL MEDICINE
Payer: MEDICARE

## 2019-10-09 DIAGNOSIS — N20.0 NEPHROLITHIASIS: ICD-10-CM

## 2019-10-09 PROCEDURE — 36415 COLL VENOUS BLD VENIPUNCTURE: CPT

## 2019-10-09 PROCEDURE — 83970 ASSAY OF PARATHORMONE: CPT

## 2019-10-09 PROCEDURE — 80048 BASIC METABOLIC PNL TOTAL CA: CPT

## 2019-11-21 ENCOUNTER — TELEPHONE (OUTPATIENT)
Dept: INTERNAL MEDICINE CLINIC | Facility: CLINIC | Age: 79
End: 2019-11-21

## 2019-11-21 NOTE — TELEPHONE ENCOUNTER
Pt is calling to schedule a yearly physical  Pt lives 65 miles away and will be town on Monday 12/2, can pt be added in a same day sick?   Please call pt [phone]   Tasked to nursing

## 2019-12-02 NOTE — TELEPHONE ENCOUNTER
Pt is asking if she can be seen on 12/10 for her Medicare Annual,  She will be in town seeing Dr Tracy Cruz at noon.   Please call pt [phone]   Tasked to nursing

## 2019-12-10 ENCOUNTER — OFFICE VISIT (OUTPATIENT)
Dept: INTERNAL MEDICINE CLINIC | Facility: CLINIC | Age: 79
End: 2019-12-10
Payer: MEDICARE

## 2019-12-10 VITALS
TEMPERATURE: 99 F | DIASTOLIC BLOOD PRESSURE: 80 MMHG | OXYGEN SATURATION: 96 % | BODY MASS INDEX: 28 KG/M2 | RESPIRATION RATE: 16 BRPM | SYSTOLIC BLOOD PRESSURE: 146 MMHG | WEIGHT: 158 LBS | HEART RATE: 79 BPM | HEIGHT: 63 IN

## 2019-12-10 DIAGNOSIS — Z12.31 VISIT FOR SCREENING MAMMOGRAM: ICD-10-CM

## 2019-12-10 DIAGNOSIS — R53.83 FATIGUE, UNSPECIFIED TYPE: ICD-10-CM

## 2019-12-10 DIAGNOSIS — E55.9 VITAMIN D DEFICIENCY: ICD-10-CM

## 2019-12-10 DIAGNOSIS — Z87.442 HISTORY OF NEPHROLITHIASIS: ICD-10-CM

## 2019-12-10 DIAGNOSIS — Z00.00 ENCOUNTER FOR ANNUAL HEALTH EXAMINATION: Primary | ICD-10-CM

## 2019-12-10 DIAGNOSIS — I10 ESSENTIAL HYPERTENSION: ICD-10-CM

## 2019-12-10 DIAGNOSIS — Z86.19 HISTORY OF LYME DISEASE: ICD-10-CM

## 2019-12-10 DIAGNOSIS — E04.1 THYROID NODULE: ICD-10-CM

## 2019-12-10 DIAGNOSIS — E05.90 HYPERTHYROIDISM: ICD-10-CM

## 2019-12-10 PROCEDURE — 99214 OFFICE O/P EST MOD 30 MIN: CPT | Performed by: INTERNAL MEDICINE

## 2019-12-10 PROCEDURE — 93005 ELECTROCARDIOGRAM TRACING: CPT | Performed by: INTERNAL MEDICINE

## 2019-12-10 PROCEDURE — 93010 ELECTROCARDIOGRAM REPORT: CPT | Performed by: INTERNAL MEDICINE

## 2019-12-10 PROCEDURE — G0439 PPPS, SUBSEQ VISIT: HCPCS | Performed by: INTERNAL MEDICINE

## 2019-12-10 PROCEDURE — G0463 HOSPITAL OUTPT CLINIC VISIT: HCPCS | Performed by: INTERNAL MEDICINE

## 2019-12-10 NOTE — PROGRESS NOTES
Emperatriz James is a 78year old female who presents for a Medicare Annual Wellness visit. Patient presents with:  Physical: Annual Medicare Px, feels good but gets tired easily.   gets up 2 times per night for using the washroom, naps in the afternoon at No    Difficulty dressing or bathing?: No    Problems with daily activities? : No    Memory Problems?: No      Fall/Risk Assessment          Have you fallen in the last 12 months?: 0-No                                        Fall/Risk Scorin          D Screening      Dexascan Every two years No results found for this or any previous visit. No flowsheet data found.     Pap and Pelvic      Pap: Every 3 yrs age 21-65 or Pap+HPV every 5 yrs age 33-67, age 72 and older at high risk   There are no preventive ca Dilated Eye exam  Annually No flowsheet data found. No flowsheet data found. COPD      Spirometry Testing Annually No results found for this or any previous visit. No flowsheet data found.       ALLERGIES:   No Known Allergies    CURRENT MEDICATIONS: intolerance. Neuro: Negative for Gait disturbance and memory impairment. Psych: Negative for Anxiety and depression. Integumentary: Negative for Hives and rash. MS: Negative muscle weakness.  pos for joint pain  Hema/Lymph: Negative Easy bleeding and ea deferred to urology    ASSESSMENT AND OTHER RELEVANT CHRONIC CONDITIONS:   Cait Cabello is a 78year old female who presents for a Medicare Assessment.      PLAN SUMMARY:   Diagnoses and all orders for this visit:    Encounter for annual health examinatio mild anti-bladder spasm medication may help but I am not sure. We should definitely touch base with the nephrologist about using a medication like this, and you can let me know if this bothers you bad enough to have to act on it.     Recommended Websites f

## 2019-12-10 NOTE — PATIENT INSTRUCTIONS
1. Encounter for annual health examination  Physical exam instruction: Improve diet and exercise, complete fasting labs in the near future and you will be called with results 5-7 days after completed, call with questions.     - CBC WITH DIFFERENTIAL WITH PL their home computer and printer. (the forms are also available in [address])  www. [email or web address] org  This link also has information from the [facility] regarding Advance Directives.

## 2020-02-21 ENCOUNTER — TELEPHONE (OUTPATIENT)
Dept: INTERNAL MEDICINE CLINIC | Facility: CLINIC | Age: 80
End: 2020-02-21

## 2020-02-21 DIAGNOSIS — E05.90 HYPERTHYROIDISM: ICD-10-CM

## 2020-02-21 DIAGNOSIS — I10 ESSENTIAL HYPERTENSION: Primary | ICD-10-CM

## 2020-02-21 DIAGNOSIS — E04.1 THYROID NODULE: ICD-10-CM

## 2020-02-21 NOTE — TELEPHONE ENCOUNTER
Received call from [facility] stating that Z00.00 coding is showing not accepted for coverage which was placed as diagnosis code for all labs. Advised [facility] to use Essential HTN, hyperthyroidism and vitamin d deficiency.      Advised by [facility] these diagnosis

## 2020-02-24 LAB
ABSOLUTE BASOPHILS: 28 CELLS/UL (ref 0–200)
ABSOLUTE EOSINOPHILS: 168 CELLS/UL (ref 15–500)
ABSOLUTE LYMPHOCYTES: 1296 CELLS/UL (ref 850–3900)
ABSOLUTE MONOCYTES: 260 CELLS/UL (ref 200–950)
ABSOLUTE NEUTROPHILS: 2248 CELLS/UL (ref 1500–7800)
ALBUMIN/GLOBULIN RATIO: 1.6 (CALC) (ref 1–2.5)
ALBUMIN: 4 G/DL (ref 3.6–5.1)
ALKALINE PHOSPHATASE: 78 U/L (ref 37–153)
ALT: 10 U/L (ref 6–29)
APPEARANCE: CLEAR
AST: 12 U/L (ref 10–35)
BASOPHILS: 0.7 %
BILIRUBIN, TOTAL: 0.6 MG/DL (ref 0.2–1.2)
BILIRUBIN: NEGATIVE
BUN: 15 MG/DL (ref 7–25)
CALCIUM: 9.3 MG/DL (ref 8.6–10.4)
CARBON DIOXIDE: 26 MMOL/L (ref 20–32)
CHLORIDE: 110 MMOL/L (ref 98–110)
CHOL/HDLC RATIO: 2.8 (CALC)
CHOLESTEROL, TOTAL: 158 MG/DL
COLOR: YELLOW
CREATININE: 0.74 MG/DL (ref 0.6–0.93)
EGFR IF AFRICN AM: 89 ML/MIN/1.73M2
EGFR IF NONAFRICN AM: 77 ML/MIN/1.73M2
EOSINOPHILS: 4.2 %
GLOBULIN: 2.5 G/DL (CALC) (ref 1.9–3.7)
GLUCOSE: 89 MG/DL (ref 65–99)
GLUCOSE: NEGATIVE
HDL CHOLESTEROL: 57 MG/DL
HEMATOCRIT: 41.4 % (ref 35–45)
HEMOGLOBIN: 13.9 G/DL (ref 11.7–15.5)
KETONES: NEGATIVE
LDL-CHOLESTEROL: 82 MG/DL (CALC)
LYMPHOCYTES: 32.4 %
MCH: 30.8 PG (ref 27–33)
MCHC: 33.6 G/DL (ref 32–36)
MCV: 91.6 FL (ref 80–100)
MONOCYTES: 6.5 %
MPV: 11.1 FL (ref 7.5–12.5)
NEUTROPHILS: 56.2 %
NITRITE: POSITIVE
NON-HDL CHOLESTEROL: 101 MG/DL (CALC)
OCCULT BLOOD: NEGATIVE
PH: 5.5 (ref 5–8)
PLATELET COUNT: 208 THOUSAND/UL (ref 140–400)
POTASSIUM: 4.3 MMOL/L (ref 3.5–5.3)
PROTEIN, TOTAL: 6.5 G/DL (ref 6.1–8.1)
PROTEIN: NEGATIVE
RDW: 13 % (ref 11–15)
RED BLOOD CELL COUNT: 4.52 MILLION/UL (ref 3.8–5.1)
SODIUM: 143 MMOL/L (ref 135–146)
SPECIFIC GRAVITY: 1.02 (ref 1–1.03)
TRIGLYCERIDES: 94 MG/DL
TSH W/REFLEX TO FT4: 3.02 MIU/L (ref 0.4–4.5)
VITAMIN D, 25-OH, TOTAL: 26 NG/ML (ref 30–100)
WHITE BLOOD CELL COUNT: 4 THOUSAND/UL (ref 3.8–10.8)

## 2020-02-25 ENCOUNTER — TELEPHONE (OUTPATIENT)
Dept: INTERNAL MEDICINE CLINIC | Facility: CLINIC | Age: 80
End: 2020-02-25

## 2020-02-25 RX ORDER — NITROFURANTOIN 25; 75 MG/1; MG/1
100 CAPSULE ORAL 2 TIMES DAILY
Qty: 14 CAPSULE | Refills: 0 | Status: SHIPPED | OUTPATIENT
Start: 2020-02-25

## 2020-02-25 NOTE — TELEPHONE ENCOUNTER
Nursing please call patient, let her know I did review her lab work from yesterday, it does look like the urine testing did trigger a culture to be done, and this looks to be positive for E. coli.   It is not significant enough numbers for us to treat with

## 2020-02-25 NOTE — TELEPHONE ENCOUNTER
Dr. Theo Curry- please clarify as your note reads: \"It is not significant enough numbers for us to treat with antibiotics. I do recommend she takes Macrobid twice daily for 7 days to clear the infection\"    Macrobid BID is still pended.  Before nursing orders

## 2020-02-26 NOTE — TELEPHONE ENCOUNTER
Noted, I relayed MDs message to patient-verbalized understanding. She denies any UTI sx, no hematuria. Informed that abx/macrobid was sent to her pharmacy to treat her UTI. Directions/instructions reviewed with patient.

## 2020-08-03 DIAGNOSIS — J01.11 ACUTE RECURRENT FRONTAL SINUSITIS: ICD-10-CM

## 2020-08-04 RX ORDER — FLUTICASONE PROPIONATE 50 MCG
SPRAY, SUSPENSION (ML) NASAL
Qty: 48 G | Refills: 3 | Status: SHIPPED | OUTPATIENT
Start: 2020-08-04

## 2020-10-22 NOTE — TELEPHONE ENCOUNTER
To Dr. Sohail Britt to please advise----  Metoprolol succinate ER 25 mg daily tablets last refilled 09/26/2018.  To Dr. Sohail Britt to please advise if you'd like pt to continue this medication and please notify if OK for refill to be sent if appropriate. (3 month supply if yes

## 2020-10-23 RX ORDER — METOPROLOL SUCCINATE 25 MG
TABLET, EXTENDED RELEASE 24 HR ORAL
Qty: 90 TABLET | Refills: 3 | Status: SHIPPED | OUTPATIENT
Start: 2020-10-23

## 2020-10-26 ENCOUNTER — TELEPHONE (OUTPATIENT)
Dept: INTERNAL MEDICINE CLINIC | Facility: CLINIC | Age: 80
End: 2020-10-26

## 2020-10-26 NOTE — TELEPHONE ENCOUNTER
Please call pt, she is asking for the date of her last pneumonia vaccine  Is she due to have a vaccine?   Tasked to nursing

## 2020-11-09 ENCOUNTER — TELEPHONE (OUTPATIENT)
Dept: INTERNAL MEDICINE CLINIC | Facility: CLINIC | Age: 80
End: 2020-11-09

## 2020-11-09 NOTE — TELEPHONE ENCOUNTER
Pt. Called to inform Dr. Yohan Lee that they received their flu shot today at [address] [place] [address] in [place]  ph.  # [phone]   Routed to clinical

## 2021-04-07 ENCOUNTER — IMMUNIZATION (OUTPATIENT)
Dept: LAB | Facility: HOSPITAL | Age: 81
End: 2021-04-07
Attending: HOSPITALIST
Payer: MEDICARE

## 2021-04-07 DIAGNOSIS — Z23 NEED FOR VACCINATION: Primary | ICD-10-CM

## 2021-04-07 PROCEDURE — 0011A SARSCOV2 VAC 100MCG/0.5ML IM: CPT

## 2021-04-17 PROBLEM — E04.2 MULTIPLE THYROID NODULES: Status: ACTIVE | Noted: 2021-04-17

## 2021-05-05 ENCOUNTER — IMMUNIZATION (OUTPATIENT)
Dept: LAB | Facility: HOSPITAL | Age: 81
End: 2021-05-05
Attending: EMERGENCY MEDICINE
Payer: MEDICARE

## 2021-05-05 DIAGNOSIS — Z23 NEED FOR VACCINATION: Primary | ICD-10-CM

## 2021-05-05 PROCEDURE — 0012A SARSCOV2 VAC 100MCG/0.5ML IM: CPT

## 2021-05-14 NOTE — TELEPHONE ENCOUNTER
Refill request received for metoprolol. Per [facility] records, last OV was 2/28/17. To , please call the patient to schedule annual PE then back to Rx. injury, hand

## 2021-05-20 ENCOUNTER — HOSPITAL ENCOUNTER (OUTPATIENT)
Dept: ULTRASOUND IMAGING | Facility: HOSPITAL | Age: 81
Discharge: HOME | End: 2021-05-20
Attending: INTERNAL MEDICINE
Payer: MEDICARE

## 2021-05-20 ENCOUNTER — HOSPITAL ENCOUNTER (OUTPATIENT)
Dept: BONE DENSITY | Facility: HOSPITAL | Age: 81
Discharge: HOME | End: 2021-05-20
Attending: INTERNAL MEDICINE
Payer: MEDICARE

## 2021-05-20 DIAGNOSIS — E05.90 HYPERTHYROIDISM: ICD-10-CM

## 2021-05-20 DIAGNOSIS — E04.2 MULTIPLE THYROID NODULES: ICD-10-CM

## 2021-05-20 PROCEDURE — 77080 DXA BONE DENSITY AXIAL: CPT | Performed by: INTERNAL MEDICINE

## 2021-05-20 PROCEDURE — 76536 US EXAM OF HEAD AND NECK: CPT | Performed by: INTERNAL MEDICINE

## 2021-10-22 ENCOUNTER — TELEPHONE (OUTPATIENT)
Dept: INTERNAL MEDICINE CLINIC | Facility: CLINIC | Age: 81
End: 2021-10-22

## 2021-10-22 NOTE — TELEPHONE ENCOUNTER
Patient called and gave dates. Pt states she had high dose flu vaccine yesterday which was added to immunization record.

## 2022-05-09 ENCOUNTER — TELEPHONE (OUTPATIENT)
Dept: INTERNAL MEDICINE CLINIC | Facility: CLINIC | Age: 82
End: 2022-05-09

## 2022-05-09 RX ORDER — FLUTICASONE PROPIONATE 50 MCG
2 SPRAY, SUSPENSION (ML) NASAL 2 TIMES DAILY
Qty: 2 EACH | Refills: 3 | Status: SHIPPED | OUTPATIENT
Start: 2022-05-09

## 2022-05-09 NOTE — TELEPHONE ENCOUNTER
Patient is calling to request a refill on FLUTICASONE PROPIONATE 50 MCG/ACT Nasal Suspension. Please send to [facility]. Patient also scheduled a medicare annual with Dr Saba Perera for 6/20/2022 at 1330.

## 2022-06-13 ENCOUNTER — TELEPHONE (OUTPATIENT)
Dept: INTERNAL MEDICINE CLINIC | Facility: CLINIC | Age: 82
End: 2022-06-13

## 2022-06-13 NOTE — TELEPHONE ENCOUNTER
Pt is going to [facility] in [place] for blood work today at [facility] if Dr Mj Dominguez would want to add any lab orders to Dr Radhames Pedersen orders    Pt provided [facility] Fax # [phone]    - pt was advised Dr Mj Dominguez is not in the office this morning / unsure if request can be addressed before her appt at [facility]

## 2022-06-16 NOTE — TELEPHONE ENCOUNTER
Spoke with patient. She completed the following labs at [facility] (TSH w.reflex to free t4, free t4 thyroxine, t3, CBC w/Diff) ordered by Dr. Eliana Rod, [facility]. Per patient, [facility] can no longer fax results to other care team members, only the ordering physician. Therefore, I called Dr. Kandis Buchanan office and requested a copy of lab orders. They will fax today.    Patient is scheduled for a Medicare Annual on 6/20/22

## 2022-06-20 ENCOUNTER — MED REC SCAN ONLY (OUTPATIENT)
Dept: INTERNAL MEDICINE CLINIC | Facility: CLINIC | Age: 82
End: 2022-06-20

## 2022-06-20 ENCOUNTER — OFFICE VISIT (OUTPATIENT)
Dept: INTERNAL MEDICINE CLINIC | Facility: CLINIC | Age: 82
End: 2022-06-20
Payer: MEDICARE

## 2022-06-20 VITALS
HEART RATE: 61 BPM | SYSTOLIC BLOOD PRESSURE: 128 MMHG | WEIGHT: 156.81 LBS | DIASTOLIC BLOOD PRESSURE: 74 MMHG | OXYGEN SATURATION: 97 % | TEMPERATURE: 98 F | BODY MASS INDEX: 26.77 KG/M2 | HEIGHT: 64 IN

## 2022-06-20 DIAGNOSIS — Z00.00 ENCOUNTER FOR ANNUAL HEALTH EXAMINATION: ICD-10-CM

## 2022-06-20 DIAGNOSIS — Z86.19 HISTORY OF LYME DISEASE: ICD-10-CM

## 2022-06-20 DIAGNOSIS — Z00.00 PHYSICAL EXAM: Primary | ICD-10-CM

## 2022-06-20 DIAGNOSIS — E55.9 VITAMIN D DEFICIENCY: ICD-10-CM

## 2022-06-20 DIAGNOSIS — I10 ESSENTIAL HYPERTENSION: ICD-10-CM

## 2022-06-20 DIAGNOSIS — Z87.442 HISTORY OF NEPHROLITHIASIS: ICD-10-CM

## 2022-06-20 DIAGNOSIS — E05.90 HYPERTHYROIDISM: ICD-10-CM

## 2022-06-20 DIAGNOSIS — Z91.09 ENVIRONMENTAL ALLERGIES: ICD-10-CM

## 2022-06-20 DIAGNOSIS — E04.2 MULTIPLE THYROID NODULES: ICD-10-CM

## 2022-06-20 RX ORDER — HYDROCHLOROTHIAZIDE 12.5 MG/1
12.5 CAPSULE, GELATIN COATED ORAL DAILY
COMMUNITY
Start: 2021-02-04

## 2022-06-20 RX ORDER — MULTIVIT-MIN/IRON/FOLIC ACID/K 18-600-40
1 CAPSULE ORAL DAILY
COMMUNITY

## 2022-06-20 RX ORDER — POTASSIUM CHLORIDE 750 MG/1
10 TABLET, FILM COATED, EXTENDED RELEASE ORAL 2 TIMES DAILY
Qty: 180 TABLET | Refills: 3 | Status: SHIPPED | OUTPATIENT
Start: 2022-06-20

## 2022-06-20 RX ORDER — FLUTICASONE PROPIONATE 50 MCG
2 SPRAY, SUSPENSION (ML) NASAL DAILY
Qty: 3 EACH | Refills: 3 | Status: SHIPPED | OUTPATIENT
Start: 2022-06-20

## 2022-06-20 NOTE — PATIENT INSTRUCTIONS
1. Physical exam  Physical exam instruction: Improve diet and exercise, complete fasting labs in the near future and you will be called with results 5-7 days after completed, call with questions. Call the central scheduling number at [phone] to schedule at any of the [facility] locations    - ELECTROCARDIOGRAM, COMPLETE: Sinus bradycardia 55 beats minute, nonspecific ST-T wave changes, artifact, normal variant EKG  - CBC WITH DIFFERENTIAL WITH PLATELET; Future  - COMP METABOLIC PANEL (14); Future  - URINALYSIS WITH CULTURE REFLEX  - LIPID PANEL; Future    2. Hyperthyroidism  Stable cont monitoring and management, f/u with dr Evelia Lira     3. Essential hypertension  Stable cont monitoring and management  - Potassium Chloride ER 10 MEQ Oral Tab CR; Take 1 tablet (10 mEq total) by mouth 2 (two) times daily. Dispense: 180 tablet; Refill: 3    4. Vitamin D deficiency  Stable cont monitoring and management  - VITAMIN D, 25-HYDROXY; Future    5. History of nephrolithiasis  Stable cont monitoring and management    6. Multiple thyroid nodules  Stable cont monitoring and management    7. History of Lyme disease  Stable cont monitoring and management    8. Environmental allergies  Stable cont monitoring and management  - fluticasone propionate 50 MCG/ACT Nasal Suspension; 2 sprays by Each Nare route daily. Dispense: 3 each;  Refill: 3

## 2022-06-20 NOTE — PROGRESS NOTES
Labs brought in to today's visit from [address] [place] [address]. Copies to scanning and one week hold.

## 2022-11-04 ENCOUNTER — TELEPHONE (OUTPATIENT)
Dept: INTERNAL MEDICINE CLINIC | Facility: CLINIC | Age: 82
End: 2022-11-04

## 2022-11-04 NOTE — TELEPHONE ENCOUNTER
Brandon garnica Vaccination Record    Fluzone Quad [id number] Syringe Left Arm  Intramuscular 0.50 ml 11/4/2022

## 2023-03-06 LAB
ABSOLUTE BASOPHILS: 19 CELLS/UL (ref 0–200)
ABSOLUTE EOSINOPHILS: 108 CELLS/UL (ref 15–500)
ABSOLUTE LYMPHOCYTES: 1072 CELLS/UL (ref 850–3900)
ABSOLUTE MONOCYTES: 310 CELLS/UL (ref 200–950)
ABSOLUTE NEUTROPHILS: 3191 CELLS/UL (ref 1500–7800)
ALBUMIN/GLOBULIN RATIO: 1.7 (CALC) (ref 1–2.5)
ALBUMIN: 4.3 G/DL (ref 3.6–5.1)
ALKALINE PHOSPHATASE: 69 U/L (ref 37–153)
ALT: 9 U/L (ref 6–29)
AST: 14 U/L (ref 10–35)
BASOPHILS: 0.4 %
BILIRUBIN, TOTAL: 0.6 MG/DL (ref 0.2–1.2)
BILIRUBIN: NEGATIVE
BUN: 21 MG/DL (ref 7–25)
CALCIUM: 10 MG/DL (ref 8.6–10.4)
CARBON DIOXIDE: 24 MMOL/L (ref 20–32)
CHLORIDE: 108 MMOL/L (ref 98–110)
CHOL/HDLC RATIO: 3.5 (CALC)
CHOLESTEROL, TOTAL: 164 MG/DL
COLOR: YELLOW
CREATININE: 0.79 MG/DL (ref 0.6–0.95)
EGFR: 75 ML/MIN/1.73M2
EOSINOPHILS: 2.3 %
GLOBULIN: 2.5 G/DL (CALC) (ref 1.9–3.7)
GLUCOSE: 95 MG/DL (ref 65–99)
GLUCOSE: NEGATIVE
HDL CHOLESTEROL: 47 MG/DL
HEMATOCRIT: 45.4 % (ref 35–45)
HEMOGLOBIN: 15 G/DL (ref 11.7–15.5)
KETONES: NEGATIVE
LDL-CHOLESTEROL: 94 MG/DL (CALC)
LYMPHOCYTES: 22.8 %
MCH: 30.3 PG (ref 27–33)
MCHC: 33 G/DL (ref 32–36)
MCV: 91.7 FL (ref 80–100)
MONOCYTES: 6.6 %
MPV: 11.5 FL (ref 7.5–12.5)
NEUTROPHILS: 67.9 %
NITRITE: POSITIVE
NON-HDL CHOLESTEROL: 117 MG/DL (CALC)
PH: 5.5 (ref 5–8)
PLATELET COUNT: 198 THOUSAND/UL (ref 140–400)
POTASSIUM: 3.9 MMOL/L (ref 3.5–5.3)
PROTEIN, TOTAL: 6.8 G/DL (ref 6.1–8.1)
PROTEIN: NEGATIVE
RDW: 12.4 % (ref 11–15)
RED BLOOD CELL COUNT: 4.95 MILLION/UL (ref 3.8–5.1)
SODIUM: 140 MMOL/L (ref 135–146)
SPECIFIC GRAVITY: 1.02 (ref 1–1.03)
TRIGLYCERIDES: 132 MG/DL
VITAMIN D, 25-OH, TOTAL: 57 NG/ML (ref 30–100)
WHITE BLOOD CELL COUNT: 4.7 THOUSAND/UL (ref 3.8–10.8)

## 2023-03-06 NOTE — TELEPHONE ENCOUNTER
Pt. Called stating she needs a refill on Metoprolol Succinate 25 mgs  1 daily  #90. Please send to [facility]. Pt. only has a few left.

## 2023-03-07 ENCOUNTER — TELEPHONE (OUTPATIENT)
Dept: INTERNAL MEDICINE CLINIC | Facility: CLINIC | Age: 83
End: 2023-03-07

## 2023-03-07 DIAGNOSIS — N39.0 E. COLI UTI: Primary | ICD-10-CM

## 2023-03-07 DIAGNOSIS — B96.20 E. COLI UTI: Primary | ICD-10-CM

## 2023-03-07 RX ORDER — NITROFURANTOIN 25; 75 MG/1; MG/1
100 CAPSULE ORAL 2 TIMES DAILY
Qty: 14 CAPSULE | Refills: 0 | Status: SHIPPED | OUTPATIENT
Start: 2023-03-07 | End: 2023-05-22 | Stop reason: ALTCHOICE

## 2023-03-07 RX ORDER — METOPROLOL SUCCINATE 25 MG/1
25 TABLET, EXTENDED RELEASE ORAL DAILY
Qty: 90 TABLET | Refills: 3 | Status: CANCELLED | OUTPATIENT
Start: 2023-03-07

## 2023-03-07 NOTE — TELEPHONE ENCOUNTER
Patient is calling back to check the status of the refill. Patient states she only has six tablets left and is requesting a short refill to be sent to her local pharmacy at [facility] in [place] on [address] [place] [address] and a full refill sent to "[facility] Request sent to [facility]. [facility] off till Thursday.

## 2023-03-07 NOTE — TELEPHONE ENCOUNTER
See associated MyChart/results note, antibiotics sent in for this patient based on her urine results, nursing to call

## 2023-03-08 ENCOUNTER — TELEPHONE (OUTPATIENT)
Dept: INTERNAL MEDICINE CLINIC | Facility: CLINIC | Age: 83
End: 2023-03-08

## 2023-03-08 NOTE — TELEPHONE ENCOUNTER
Patient is calling to check on the status of Metoprolol qty 90 day    Requesting this be done today because she needs to  the abx and she has to drive 25 miles to the pharmacy    Please call patient when complete [phone]

## 2023-03-08 NOTE — TELEPHONE ENCOUNTER
Dr. Erma Rubin is not seeing patient until May and suggested Dr. Diandra Ray fill it until this appointment since he originally prescribed. Patient lives in [place] area and pharmacy is 25miles from home so she would prefer a call back and to  abx along with refill request below.

## 2023-03-09 RX ORDER — METOPROLOL SUCCINATE 25 MG/1
25 TABLET, EXTENDED RELEASE ORAL DAILY
Qty: 90 TABLET | Refills: 3 | Status: SHIPPED | OUTPATIENT
Start: 2023-03-09

## 2023-03-09 NOTE — TELEPHONE ENCOUNTER
----- Message from Berkley Almanza DO sent at 3/7/2023  3:45 PM CST -----  Nursing, can you call this patient let her know that lab work basically looks pretty good, no glaring abnormality with the exception of the urine, the urine does seem to be growing out an organism that I want her to clear with antibiotics, Macrobid 100 mg twice daily for 7 days regardless if she is feeling symptoms or not.   Follow-up with me as discussed

## 2023-05-08 ENCOUNTER — TELEPHONE (OUTPATIENT)
Dept: NEPHROLOGY | Facility: CLINIC | Age: 83
End: 2023-05-08

## 2023-05-08 ENCOUNTER — TELEPHONE (OUTPATIENT)
Dept: INTERNAL MEDICINE CLINIC | Facility: CLINIC | Age: 83
End: 2023-05-08

## 2023-05-08 DIAGNOSIS — E05.90 HYPERTHYROIDISM: ICD-10-CM

## 2023-05-08 DIAGNOSIS — E55.9 VITAMIN D DEFICIENCY: Primary | ICD-10-CM

## 2023-05-08 DIAGNOSIS — I10 ESSENTIAL HYPERTENSION, BENIGN: ICD-10-CM

## 2023-05-08 DIAGNOSIS — N18.30 STAGE 3 CHRONIC KIDNEY DISEASE, UNSPECIFIED WHETHER STAGE 3A OR 3B CKD (HCC): Primary | ICD-10-CM

## 2023-05-08 NOTE — TELEPHONE ENCOUNTER
Please advise on pending [facility] fax # [phone]  Patient had complete labs done March 2023-except TSH  -to DR. GILES

## 2023-05-08 NOTE — TELEPHONE ENCOUNTER
Called patient and relayed DR. GILES message - verbalized understanding,Fax to [facility] at [phone]

## 2023-05-08 NOTE — TELEPHONE ENCOUNTER
Dr. [facility], please see note below. Scheduled for 8-22-23. Only lab order noted is renal panel expiring 7-5-23.

## 2023-05-08 NOTE — TELEPHONE ENCOUNTER
Patient called  She is scheduled for an appt on 5/22/23  Patient will go for labs prior  Please call patient when orders have been entered  [facility] uses [facility]  Tasked to nursing

## 2023-05-08 NOTE — TELEPHONE ENCOUNTER
Patient is calling to ask if she needs to have labs done before appointment. [facility] if [facility] are needed.

## 2023-05-09 ENCOUNTER — MED REC SCAN ONLY (OUTPATIENT)
Dept: INTERNAL MEDICINE CLINIC | Facility: CLINIC | Age: 83
End: 2023-05-09

## 2023-05-10 NOTE — TELEPHONE ENCOUNTER
Spoke with patient, confirmed fax number below and she would like orders mailed to her home as well.

## 2023-05-10 NOTE — TELEPHONE ENCOUNTER
Lmtcb. Wanted to confirm correct place to fax labs to is [facility] in [place], [place] at [address] Fax number: [phone]  Will also mail lab orders to pt's address on file.

## 2023-05-10 NOTE — TELEPHONE ENCOUNTER
Faxed lab orders to [facility] fax number below. Orders have also been mailed to pt's address on file per request. Urinalysis and renal panel. Pending confirmation.

## 2023-05-19 LAB
APPEARANCE: CLEAR
BILIRUBIN: NEGATIVE
GLUCOSE: NEGATIVE
KETONES: NEGATIVE
NITRITE: POSITIVE
OCCULT BLOOD: NEGATIVE
PROTEIN: NEGATIVE
SPECIFIC GRAVITY: 1.02 (ref 1–1.03)

## 2023-05-22 ENCOUNTER — OFFICE VISIT (OUTPATIENT)
Dept: INTERNAL MEDICINE CLINIC | Facility: CLINIC | Age: 83
End: 2023-05-22

## 2023-05-22 VITALS
WEIGHT: 153 LBS | HEIGHT: 64 IN | BODY MASS INDEX: 26.12 KG/M2 | OXYGEN SATURATION: 96 % | DIASTOLIC BLOOD PRESSURE: 82 MMHG | HEART RATE: 90 BPM | TEMPERATURE: 99 F | SYSTOLIC BLOOD PRESSURE: 136 MMHG

## 2023-05-22 DIAGNOSIS — N39.0 E. COLI UTI: ICD-10-CM

## 2023-05-22 DIAGNOSIS — Z87.442 HISTORY OF NEPHROLITHIASIS: ICD-10-CM

## 2023-05-22 DIAGNOSIS — Z00.00 ENCOUNTER FOR ANNUAL HEALTH EXAMINATION: Primary | ICD-10-CM

## 2023-05-22 DIAGNOSIS — E55.9 VITAMIN D DEFICIENCY: ICD-10-CM

## 2023-05-22 DIAGNOSIS — E05.90 HYPERTHYROIDISM: ICD-10-CM

## 2023-05-22 DIAGNOSIS — Z91.09 ENVIRONMENTAL ALLERGIES: ICD-10-CM

## 2023-05-22 DIAGNOSIS — E04.2 MULTIPLE THYROID NODULES: ICD-10-CM

## 2023-05-22 DIAGNOSIS — B96.20 E. COLI UTI: ICD-10-CM

## 2023-05-22 DIAGNOSIS — I10 ESSENTIAL HYPERTENSION: ICD-10-CM

## 2023-05-22 DIAGNOSIS — Z86.19 HISTORY OF LYME DISEASE: ICD-10-CM

## 2023-05-22 LAB
ATRIAL RATE: 61 BPM
P AXIS: 51 DEGREES
P-R INTERVAL: 188 MS
Q-T INTERVAL: 442 MS
QRS DURATION: 84 MS
QTC CALCULATION (BEZET): 444 MS
R AXIS: -8 DEGREES
T AXIS: 4 DEGREES
VENTRICULAR RATE: 61 BPM

## 2023-05-22 RX ORDER — FLUTICASONE PROPIONATE 50 MCG
2 SPRAY, SUSPENSION (ML) NASAL
Qty: 16 G | Refills: 3 | Status: SHIPPED | OUTPATIENT
Start: 2023-05-22

## 2023-05-22 RX ORDER — HYDROCHLOROTHIAZIDE 12.5 MG/1
12.5 CAPSULE, GELATIN COATED ORAL DAILY
Qty: 90 CAPSULE | Refills: 3 | Status: SHIPPED | OUTPATIENT
Start: 2023-05-22

## 2023-05-22 RX ORDER — METOPROLOL SUCCINATE 25 MG/1
25 TABLET, EXTENDED RELEASE ORAL DAILY
Qty: 90 TABLET | Refills: 3 | Status: SHIPPED | OUTPATIENT
Start: 2023-05-22

## 2023-05-22 RX ORDER — POTASSIUM CHLORIDE 750 MG/1
20 TABLET, FILM COATED, EXTENDED RELEASE ORAL DAILY
Qty: 180 TABLET | Refills: 3 | Status: SHIPPED | OUTPATIENT
Start: 2023-05-22 | End: 2023-05-22

## 2023-05-22 RX ORDER — POTASSIUM CHLORIDE 750 MG/1
20 TABLET, FILM COATED, EXTENDED RELEASE ORAL DAILY
Qty: 180 TABLET | Refills: 3 | Status: SHIPPED | OUTPATIENT
Start: 2023-05-22

## 2023-05-22 RX ORDER — FLUTICASONE PROPIONATE 50 MCG
2 SPRAY, SUSPENSION (ML) NASAL
Qty: 16 G | Refills: 3 | Status: SHIPPED | OUTPATIENT
Start: 2023-05-22 | End: 2023-05-22

## 2023-05-22 RX ORDER — NITROFURANTOIN 25; 75 MG/1; MG/1
100 CAPSULE ORAL 2 TIMES DAILY
Qty: 14 CAPSULE | Refills: 0 | Status: SHIPPED | OUTPATIENT
Start: 2023-05-22

## 2023-05-22 RX ORDER — METOPROLOL SUCCINATE 25 MG/1
25 TABLET, EXTENDED RELEASE ORAL DAILY
Qty: 90 TABLET | Refills: 3 | Status: SHIPPED | OUTPATIENT
Start: 2023-05-22 | End: 2023-05-22

## 2023-05-22 RX ORDER — HYDROCHLOROTHIAZIDE 12.5 MG/1
12.5 CAPSULE, GELATIN COATED ORAL DAILY
Qty: 90 CAPSULE | Refills: 3 | Status: SHIPPED | OUTPATIENT
Start: 2023-05-22 | End: 2023-05-22

## 2023-05-22 NOTE — PATIENT INSTRUCTIONS
1. Encounter for annual health examination  Physical exam instruction: Improve diet and exercise    2. Essential hypertension  Stable continue current monitoring management here  - Potassium Chloride ER 10 MEQ Oral Tab CR; Take 2 tablets (20 mEq total) by mouth daily. Dispense: 180 tablet; Refill: 3  - metoprolol succinate ER (TOPROL XL) 25 MG Oral Tablet 24 Hr; Take 1 tablet (25 mg total) by mouth daily. Dispense: 90 tablet; Refill: 3  - hydroCHLOROthiazide 12.5 MG Oral Cap; Take 1 capsule (12.5 mg total) by mouth daily. Dispense: 90 capsule; Refill: 3  - OFFICE/OUTPT VISIT,EST,LEVL IV  - ELECTROCARDIOGRAM, COMPLETE: Sinus rhythm at 61 beats minute, no ST-T wave changes, normal EKG     3. Environmental allergies  Stable continue current monitoring management here  - fluticasone propionate 50 MCG/ACT Nasal Suspension; 2 sprays by Each Nare route daily as needed. Dispense: 16 g; Refill: 3  - OFFICE/OUTPT VISIT,EST,LEVL IV    4. E. coli UTI  Stable continue current monitoring management here  - nitrofurantoin monohydrate macro 100 MG Oral Cap; Take 1 capsule (100 mg total) by mouth 2 (two) times daily. Dispense: 14 capsule; Refill: 0  - OFFICE/OUTPT VISIT,EST,LEVL IV    5. History of Lyme disease  Stable continue current monitoring management here  - OFFICE/OUTPT VISIT,EST,LEVL IV    6. History of nephrolithiasis  Stable continue current monitoring management here  - OFFICE/OUTPT VISIT,EST,LEVL IV    7. Hyperthyroidism  Stable continue current monitoring management here  - OFFICE/OUTPT VISIT,EST,LEVL IV    8. Multiple thyroid nodules  Stable continue current monitoring management here  - OFFICE/OUTPT VISIT,EST,LEVL IV    9.  Vitamin D deficiency  Stable continue current monitoring management here  - OFFICE/OUTPT VISIT,EST,LEVL IV

## 2023-06-15 ENCOUNTER — TELEPHONE (OUTPATIENT)
Dept: INTERNAL MEDICINE CLINIC | Facility: CLINIC | Age: 83
End: 2023-06-15

## 2023-06-15 NOTE — TELEPHONE ENCOUNTER
Please call patient  Was urine culture rec'd from [facility]? Patient was prescribed Cephakexian, this worked very well for patient. Previous medications did not work as well.   Please call to discuss/advise  Tasked to nursing

## 2023-06-16 NOTE — TELEPHONE ENCOUNTER
Noted, nursing I did see the results, you can let her know that the medication they gave her should have worked very well. Complete the antibiotics in their entirety, and let me know if any recurrent symptoms.

## 2023-06-16 NOTE — TELEPHONE ENCOUNTER
Per patient she feels great, took her last one yesterday , energy is back , frequency went away. Stated what they gave her at urgent care really helped. Maida Brennan    Pt stated she would call if symptoms return.

## 2023-06-22 ENCOUNTER — TELEPHONE (OUTPATIENT)
Dept: INTERNAL MEDICINE CLINIC | Facility: CLINIC | Age: 83
End: 2023-06-22

## 2023-06-22 NOTE — TELEPHONE ENCOUNTER
Patient called regarding 3/3/2023 labs not covered as not medically necessary. Contacted [facility] spoke to Traci. Lipid and urine culture denied.      Chart reviewed additional diagnosis I10 and R53.83 faxed to [phone].

## 2023-08-17 LAB
ALBUMIN: 4.2 G/DL (ref 3.6–5.1)
APPEARANCE: CLEAR
BILIRUBIN: NEGATIVE
BUN: 22 MG/DL (ref 7–25)
CALCIUM: 9.6 MG/DL (ref 8.6–10.4)
CARBON DIOXIDE: 26 MMOL/L (ref 20–32)
CHLORIDE: 106 MMOL/L (ref 98–110)
COLOR: YELLOW
CREATININE: 0.8 MG/DL (ref 0.6–0.95)
EGFR: 73 ML/MIN/1.73M2
GLUCOSE: 90 MG/DL (ref 65–99)
GLUCOSE: NEGATIVE
KETONES: NEGATIVE
LEUKOCYTE ESTERASE: NEGATIVE
NITRITE: NEGATIVE
OCCULT BLOOD: NEGATIVE
PH: 5.5 (ref 5–8)
PHOSPHATE (AS PHOSPHORUS): 3.7 MG/DL (ref 2.1–4.3)
POTASSIUM: 4.1 MMOL/L (ref 3.5–5.3)
PROTEIN: NEGATIVE
SODIUM: 142 MMOL/L (ref 135–146)
SPECIFIC GRAVITY: 1.02 (ref 1–1.03)

## 2023-08-22 ENCOUNTER — OFFICE VISIT (OUTPATIENT)
Facility: CLINIC | Age: 83
End: 2023-08-22

## 2023-08-22 VITALS
HEART RATE: 60 BPM | SYSTOLIC BLOOD PRESSURE: 138 MMHG | WEIGHT: 152 LBS | BODY MASS INDEX: 26 KG/M2 | DIASTOLIC BLOOD PRESSURE: 80 MMHG

## 2023-08-22 DIAGNOSIS — Z87.448 HISTORY OF HYDRONEPHROSIS: ICD-10-CM

## 2023-08-22 DIAGNOSIS — R00.2 PALPITATIONS: ICD-10-CM

## 2023-08-22 DIAGNOSIS — N20.0 NEPHROLITHIASIS: Primary | ICD-10-CM

## 2023-08-22 DIAGNOSIS — E04.0 NONTOXIC SIMPLE GOITRE: ICD-10-CM

## 2023-08-22 PROCEDURE — 1126F AMNT PAIN NOTED NONE PRSNT: CPT | Performed by: INTERNAL MEDICINE

## 2023-08-22 PROCEDURE — 99213 OFFICE O/P EST LOW 20 MIN: CPT | Performed by: INTERNAL MEDICINE

## 2023-08-22 NOTE — PROGRESS NOTES
[facility]    Progress Note     Hugo Mejia    80 yr old lady with nephrolithiasis , here for follow up visit . Reports no dysuria , flank pain or recent stone passage . No fever , cough or SOB   only 2 episodes of passing stone and nothing recently in past few yrs.  keeps herself well hydrated    Subjective  Recent UTI in may and treated for it with abx  No recent issues        HISTORY:  Past Medical History:   Diagnosis Date    Calculus of kidney 2009 & 2019    Lyme disease 2015    Osteoporosis screening 05-      Past Surgical History:   Procedure Laterality Date    CATARACT Left 08/01/2022    KNEE ARTHROSCOPY Left 01/01/2009    OTHER SURGICAL HISTORY      Lump removed from nose    OTHER SURGICAL HISTORY  01/01/2016    biopsy on thyroid           Medications (Active prior to today's visit):  Current Outpatient Medications   Medication Sig Dispense Refill    Potassium Chloride ER 10 MEQ Oral Tab CR Take 2 tablets (20 mEq total) by mouth daily. 180 tablet 3    metoprolol succinate ER (TOPROL XL) 25 MG Oral Tablet 24 Hr Take 1 tablet (25 mg total) by mouth daily. 90 tablet 3    hydroCHLOROthiazide 12.5 MG Oral Cap Take 1 capsule (12.5 mg total) by mouth daily. 90 capsule 3    fluticasone propionate 50 MCG/ACT Nasal Suspension 2 sprays by Each Nare route daily as needed. 16 g 3    nitrofurantoin monohydrate macro 100 MG Oral Cap Take 1 capsule (100 mg total) by mouth 2 (two) times daily. 14 capsule 0    Calcium Carb-Cholecalciferol (CALCIUM+D3) 500-400 MG-UNIT Oral Tab Take 1 tablet by mouth daily. Lactobacillus (FLORAJEN ACIDOPHILUS OR) Take 1 capsule by mouth daily. Flaxseed, Linseed, (FLAX SEED OIL OR) Take 1,000 mg by mouth daily. Omega-3 Fatty Acids (FISH OIL OR) Take 1,000 mg by mouth daily. methimazole (TAPAZOLE) 5 MG Oral Tab Take 1 tablet (5 mg total) by mouth daily.          Allergies:  No Known Allergies      ROS:     Constitutional:  Negative for decreased activity, fever, irritability and lethargy  ENMT:  Negative for ear drainage, hearing loss and nasal drainage  Eyes:  Negative for eye discharge and vision loss  Cardiovascular:  Negative for chest pain, sob  Respiratory:  Negative for cough, dyspnea and wheezing  Gastrointestinal:  Negative for abdominal pain, constipation  Genitourinary:  Negative for dysuria and hematuria  Endocrine:  Negative for abnormal sleep patterns  Hema/Lymph:  Negative for easy bleeding and easy bruising  Integumentary:  Negative for pruritus and rash  Musculoskeletal:  Negative for bone/joint symptoms  Neurological:  Negative for gait disturbance  Psychiatric:  Negative for inappropriate interaction and psychiatric symptoms      There were no vitals filed for this visit.     PHYSICAL EXAM:   Constitutional: appears well hydrated alert and responsive   Head/Face: normocephalic  Eyes/Vision: normal extraocular motion is intact  Nose/Mouth/Throat:mucous membranes are moist   Neck/Thyroid: neck is supple without adenopathy  Respiratory:  lungs are clear to auscultation bilaterally  Cardiovascular: regular rate and rhythm   Abdomen: soft, non-tender, non-distended, BS normal  Skin/Hair: no unusual rashes present, no abnormal bruising noted  Musculoskeletal: no deformities  Extremities: no edema  Neurological:  Grossly normal      Lab Results   Component Value Date     08/16/2023     03/03/2023     02/21/2020    K 4.1 08/16/2023    K 3.9 03/03/2023    K 4.3 02/21/2020     08/16/2023     03/03/2023     02/21/2020    CO2 26 08/16/2023    CO2 24 03/03/2023    CO2 26 02/21/2020    BUN 22 08/16/2023    BUN 21 03/03/2023    BUN 15 02/21/2020    CREATSERUM 0.80 08/16/2023    CREATSERUM 0.79 03/03/2023    CREATSERUM 0.74 02/21/2020    CA 9.6 08/16/2023    CA 10.0 03/03/2023    CA 9.3 02/21/2020    ALB 4.2 08/16/2023    ALB 4.3 03/03/2023    ALB 4.3 04/20/2021    PHOS 3.2 08/22/2019    PTH 89.9 (H) 10/09/2019 ASSESSMENT/PLAN:   Assessment   1. Nephrolithiasis    2. History of hydronephrosis    3. Nontoxic simple goitre    4. Palpitations       Nephrolithiasis  h/o kidney stones in past and no recent stones in last few yrs.   reviewed MAY 2021 , 24 hour urine stone profile showed absence of  hypercalciuria . On HCTZ continue along with K supplement. Low sodium , low oxalate diet advised . Fluid intake  to maintain at least  2 L urine output, also advised. H/o hydronephrosis  Last imaging study 2019 (kidney US )showed resolution of hydronephrosis  no pain and no concern for kidney stone recently    Goiter  On Tapazole . Being followed by endocrinologist ( Dr Susan Rodríguez)    Palpitations  Asymptomatic now Seen by Dr Daniel Alvarez. Rosio Haney  in past ( cardiology) On B Blocker (stable)    PLAN  Renal function stable  NO uti at this time  Fu prn          Orders This Visit:  No orders of the defined types were placed in this encounter.       Meds This Visit:  Requested Prescriptions      No prescriptions requested or ordered in this encounter       Imaging & Referrals:  None       Stephen Street MD  8/22/2023

## 2023-12-18 NOTE — TELEPHONE ENCOUNTER
Nursing please call patient, tell her on the additional views of the mammogram they do see what they think is a complex cyst on the right breast, they are not sure what this is, to me it does not look concerning, but they want to follow with a diagnostic m Has urticarial rash and mild swelling around eyes on face and throughout body.   "Good" Accent, tremulous speech at times throughout interview

## 2024-03-11 DIAGNOSIS — I10 ESSENTIAL HYPERTENSION: ICD-10-CM

## 2024-03-11 RX ORDER — METOPROLOL SUCCINATE 25 MG/1
25 TABLET, EXTENDED RELEASE ORAL DAILY
Qty: 90 TABLET | Refills: 3 | OUTPATIENT
Start: 2024-03-11

## 2024-03-11 NOTE — TELEPHONE ENCOUNTER
Current refill request refused due to refill is either a duplicate request or has active refills at the pharmacy.  Check previous templates.    Requested Prescriptions     Refused Prescriptions Disp Refills    METOPROLOL SUCCINATE ER 25 MG Oral Tablet 24 Hr [Pharmacy Med Name: METOPROLOL SUCCINATE ER TABS 25MG] 90 tablet 3     Sig: TAKE 1 TABLET DAILY     Refused By: BARBARA SANTAMARIA     Reason for Refusal: Patient has requested refill too soon      Sent on 5/22/23 for 1 year

## 2024-05-16 DIAGNOSIS — I10 ESSENTIAL HYPERTENSION: ICD-10-CM

## 2024-05-16 RX ORDER — HYDROCHLOROTHIAZIDE 12.5 MG/1
12.5 CAPSULE, GELATIN COATED ORAL DAILY
Qty: 90 CAPSULE | Refills: 0 | Status: SHIPPED | OUTPATIENT
Start: 2024-05-16

## 2024-05-16 NOTE — TELEPHONE ENCOUNTER
Refill request is for a maintenance medication and has met the criteria specified in the Ambulatory Medication Refill Standing Order for eligibility, visits, laboratory, alerts and was sent to the requested pharmacy.    Requested Prescriptions     Signed Prescriptions Disp Refills    hydroCHLOROthiazide 12.5 MG Oral Cap 90 capsule 0     Sig: TAKE 1 CAPSULE DAILY     Authorizing Provider: D'AMICO, JEFF ANTHONY     Ordering User: MARKIE GARZA     Your Appointments      Tuesday June 04, 2024 1:00 PM  Medicare Annual Well Visit with Jeff Anthony D'Amico, DO  [facility] [address] [facility] [address]  [phone]

## 2024-05-28 DIAGNOSIS — I10 ESSENTIAL HYPERTENSION: ICD-10-CM

## 2024-05-28 RX ORDER — METOPROLOL SUCCINATE 25 MG/1
25 TABLET, EXTENDED RELEASE ORAL DAILY
Qty: 90 TABLET | Refills: 0 | Status: SHIPPED | OUTPATIENT
Start: 2024-05-28

## 2024-05-28 NOTE — TELEPHONE ENCOUNTER
Due for visit; has appt scheduled.     Refill request is for a maintenance medication and has met the criteria specified in the Ambulatory Medication Refill Standing Order for eligibility, visits, laboratory, alerts and was sent to the requested pharmacy.    Requested Prescriptions     Signed Prescriptions Disp Refills    metoprolol succinate ER (TOPROL XL) 25 MG Oral Tablet 24 Hr 90 tablet 0     Sig: Take 1 tablet (25 mg total) by mouth daily.     Authorizing Provider: D'AMICO, JEFF ANTHONY     Ordering User: RACHELLE ENRIQUE

## 2024-06-04 ENCOUNTER — OFFICE VISIT (OUTPATIENT)
Dept: INTERNAL MEDICINE CLINIC | Facility: CLINIC | Age: 84
End: 2024-06-04

## 2024-06-04 ENCOUNTER — LAB ENCOUNTER (OUTPATIENT)
Dept: LAB | Age: 84
End: 2024-06-04
Attending: INTERNAL MEDICINE
Payer: MEDICARE

## 2024-06-04 VITALS
SYSTOLIC BLOOD PRESSURE: 140 MMHG | WEIGHT: 150.81 LBS | BODY MASS INDEX: 27.4 KG/M2 | HEART RATE: 70 BPM | TEMPERATURE: 98 F | DIASTOLIC BLOOD PRESSURE: 80 MMHG | HEIGHT: 62.4 IN | OXYGEN SATURATION: 97 %

## 2024-06-04 DIAGNOSIS — Z87.442 HISTORY OF NEPHROLITHIASIS: ICD-10-CM

## 2024-06-04 DIAGNOSIS — Z91.09 ENVIRONMENTAL ALLERGIES: ICD-10-CM

## 2024-06-04 DIAGNOSIS — Z86.19 HISTORY OF LYME DISEASE: ICD-10-CM

## 2024-06-04 DIAGNOSIS — Z00.00 ENCOUNTER FOR ANNUAL HEALTH EXAMINATION: ICD-10-CM

## 2024-06-04 DIAGNOSIS — E55.9 VITAMIN D DEFICIENCY: ICD-10-CM

## 2024-06-04 DIAGNOSIS — E04.2 MULTIPLE THYROID NODULES: ICD-10-CM

## 2024-06-04 DIAGNOSIS — R53.83 OTHER FATIGUE: ICD-10-CM

## 2024-06-04 DIAGNOSIS — Z00.00 ENCOUNTER FOR ANNUAL HEALTH EXAMINATION: Primary | ICD-10-CM

## 2024-06-04 DIAGNOSIS — I10 ESSENTIAL HYPERTENSION: ICD-10-CM

## 2024-06-04 DIAGNOSIS — E05.90 HYPERTHYROIDISM: ICD-10-CM

## 2024-06-04 LAB
ALBUMIN SERPL-MCNC: 4.5 G/DL (ref 3.2–4.8)
ALBUMIN/GLOB SERPL: 1.6 {RATIO} (ref 1–2)
ALP LIVER SERPL-CCNC: 94 U/L
ALT SERPL-CCNC: <7 U/L
ANION GAP SERPL CALC-SCNC: 10 MMOL/L (ref 0–18)
AST SERPL-CCNC: 15 U/L (ref ?–34)
ATRIAL RATE: 61 BPM
BASOPHILS # BLD AUTO: 0.03 X10(3) UL (ref 0–0.2)
BASOPHILS NFR BLD AUTO: 0.6 %
BILIRUB SERPL-MCNC: 0.7 MG/DL (ref 0.2–1.1)
BILIRUB UR QL: NEGATIVE
BUN BLD-MCNC: 17 MG/DL (ref 9–23)
BUN/CREAT SERPL: 19.3 (ref 10–20)
CALCIUM BLD-MCNC: 9.9 MG/DL (ref 8.7–10.4)
CHLORIDE SERPL-SCNC: 107 MMOL/L (ref 98–112)
CHOLEST SERPL-MCNC: 185 MG/DL (ref ?–200)
CLARITY UR: CLEAR
CO2 SERPL-SCNC: 26 MMOL/L (ref 21–32)
COLOR UR: YELLOW
CREAT BLD-MCNC: 0.88 MG/DL
DEPRECATED RDW RBC AUTO: 45.7 FL (ref 35.1–46.3)
EGFRCR SERPLBLD CKD-EPI 2021: 65 ML/MIN/1.73M2 (ref 60–?)
EOSINOPHIL # BLD AUTO: 0.16 X10(3) UL (ref 0–0.7)
EOSINOPHIL NFR BLD AUTO: 3.3 %
ERYTHROCYTE [DISTWIDTH] IN BLOOD BY AUTOMATED COUNT: 13.5 % (ref 11–15)
FASTING PATIENT LIPID ANSWER: YES
FASTING STATUS PATIENT QL REPORTED: YES
GLOBULIN PLAS-MCNC: 2.8 G/DL (ref 2–3.5)
GLUCOSE BLD-MCNC: 90 MG/DL (ref 70–99)
GLUCOSE UR-MCNC: NORMAL MG/DL
HCT VFR BLD AUTO: 44.5 %
HDLC SERPL-MCNC: 56 MG/DL (ref 40–59)
HGB BLD-MCNC: 15 G/DL
HGB UR QL STRIP.AUTO: NEGATIVE
IMM GRANULOCYTES # BLD AUTO: 0 X10(3) UL (ref 0–1)
IMM GRANULOCYTES NFR BLD: 0 %
KETONES UR-MCNC: NEGATIVE MG/DL
LDLC SERPL CALC-MCNC: 110 MG/DL (ref ?–100)
LEUKOCYTE ESTERASE UR QL STRIP.AUTO: 250
LYMPHOCYTES # BLD AUTO: 1.44 X10(3) UL (ref 1–4)
LYMPHOCYTES NFR BLD AUTO: 29.4 %
MCH RBC QN AUTO: 30.8 PG (ref 26–34)
MCHC RBC AUTO-ENTMCNC: 33.7 G/DL (ref 31–37)
MCV RBC AUTO: 91.4 FL
MONOCYTES # BLD AUTO: 0.37 X10(3) UL (ref 0.1–1)
MONOCYTES NFR BLD AUTO: 7.6 %
NEUTROPHILS # BLD AUTO: 2.9 X10 (3) UL (ref 1.5–7.7)
NEUTROPHILS # BLD AUTO: 2.9 X10(3) UL (ref 1.5–7.7)
NEUTROPHILS NFR BLD AUTO: 59.1 %
NITRITE UR QL STRIP.AUTO: NEGATIVE
NONHDLC SERPL-MCNC: 129 MG/DL (ref ?–130)
OSMOLALITY SERPL CALC.SUM OF ELEC: 297 MOSM/KG (ref 275–295)
P AXIS: 60 DEGREES
P-R INTERVAL: 186 MS
PH UR: 5.5 [PH] (ref 5–8)
PLATELET # BLD AUTO: 203 10(3)UL (ref 150–450)
POTASSIUM SERPL-SCNC: 4 MMOL/L (ref 3.5–5.1)
PROT SERPL-MCNC: 7.3 G/DL (ref 5.7–8.2)
PROT UR-MCNC: NEGATIVE MG/DL
Q-T INTERVAL: 458 MS
QRS DURATION: 78 MS
QTC CALCULATION (BEZET): 461 MS
R AXIS: -6 DEGREES
RBC # BLD AUTO: 4.87 X10(6)UL
SODIUM SERPL-SCNC: 143 MMOL/L (ref 136–145)
SP GR UR STRIP: 1.02 (ref 1–1.03)
T AXIS: -6 DEGREES
T3FREE SERPL-MCNC: 2.77 PG/ML (ref 2.4–4.2)
T4 FREE SERPL-MCNC: 0.9 NG/DL (ref 0.8–1.7)
TRIGL SERPL-MCNC: 105 MG/DL (ref 30–149)
TSI SER-ACNC: 2.71 MIU/ML (ref 0.55–4.78)
UROBILINOGEN UR STRIP-ACNC: NORMAL
VENTRICULAR RATE: 61 BPM
VIT D+METAB SERPL-MCNC: 84.4 NG/ML (ref 30–100)
VLDLC SERPL CALC-MCNC: 18 MG/DL (ref 0–30)
WBC # BLD AUTO: 4.9 X10(3) UL (ref 4–11)

## 2024-06-04 PROCEDURE — 99214 OFFICE O/P EST MOD 30 MIN: CPT | Performed by: INTERNAL MEDICINE

## 2024-06-04 PROCEDURE — 80053 COMPREHEN METABOLIC PANEL: CPT

## 2024-06-04 PROCEDURE — 87077 CULTURE AEROBIC IDENTIFY: CPT | Performed by: INTERNAL MEDICINE

## 2024-06-04 PROCEDURE — 85025 COMPLETE CBC W/AUTO DIFF WBC: CPT

## 2024-06-04 PROCEDURE — 81001 URINALYSIS AUTO W/SCOPE: CPT | Performed by: INTERNAL MEDICINE

## 2024-06-04 PROCEDURE — 84443 ASSAY THYROID STIM HORMONE: CPT

## 2024-06-04 PROCEDURE — 87186 SC STD MICRODIL/AGAR DIL: CPT | Performed by: INTERNAL MEDICINE

## 2024-06-04 PROCEDURE — 96372 THER/PROPH/DIAG INJ SC/IM: CPT | Performed by: INTERNAL MEDICINE

## 2024-06-04 PROCEDURE — 82306 VITAMIN D 25 HYDROXY: CPT

## 2024-06-04 PROCEDURE — 87086 URINE CULTURE/COLONY COUNT: CPT | Performed by: INTERNAL MEDICINE

## 2024-06-04 PROCEDURE — 36415 COLL VENOUS BLD VENIPUNCTURE: CPT

## 2024-06-04 PROCEDURE — 84439 ASSAY OF FREE THYROXINE: CPT

## 2024-06-04 PROCEDURE — G0439 PPPS, SUBSEQ VISIT: HCPCS | Performed by: INTERNAL MEDICINE

## 2024-06-04 PROCEDURE — 84481 FREE ASSAY (FT-3): CPT

## 2024-06-04 PROCEDURE — 93000 ELECTROCARDIOGRAM COMPLETE: CPT | Performed by: INTERNAL MEDICINE

## 2024-06-04 PROCEDURE — 80061 LIPID PANEL: CPT

## 2024-06-04 RX ORDER — HYDROCHLOROTHIAZIDE 12.5 MG/1
12.5 CAPSULE, GELATIN COATED ORAL DAILY
Qty: 90 CAPSULE | Refills: 3 | Status: SHIPPED | OUTPATIENT
Start: 2024-06-04

## 2024-06-04 RX ORDER — POTASSIUM CHLORIDE 750 MG/1
20 TABLET, FILM COATED, EXTENDED RELEASE ORAL DAILY
Qty: 180 TABLET | Refills: 3 | Status: SHIPPED | OUTPATIENT
Start: 2024-06-04

## 2024-06-04 RX ORDER — METOPROLOL SUCCINATE 25 MG/1
25 TABLET, EXTENDED RELEASE ORAL DAILY
Qty: 90 TABLET | Refills: 0 | Status: SHIPPED | OUTPATIENT
Start: 2024-06-04

## 2024-06-04 RX ORDER — CYANOCOBALAMIN 1000 UG/ML
1000 INJECTION, SOLUTION INTRAMUSCULAR; SUBCUTANEOUS ONCE
Status: COMPLETED | OUTPATIENT
Start: 2024-06-04 | End: 2024-06-04

## 2024-06-04 RX ORDER — FLUTICASONE PROPIONATE 50 MCG
2 SPRAY, SUSPENSION (ML) NASAL
Qty: 16 G | Refills: 3 | Status: SHIPPED | OUTPATIENT
Start: 2024-06-04

## 2024-06-04 RX ADMIN — CYANOCOBALAMIN 1000 MCG: 1000 INJECTION, SOLUTION INTRAMUSCULAR; SUBCUTANEOUS at 14:04:00

## 2024-06-04 NOTE — PATIENT INSTRUCTIONS
1. Encounter for annual health examination  Physical exam instruction: Improve diet and exercise, complete fasting labs in the near future and you will be called with results 5-7 days after completed, call with questions.  Call the central scheduling number at [phone] to schedule at any of the [facility] locations  - CBC With Differential With Platelet; Future  - Comp Metabolic Panel (14); Future  - Urinalysis with Culture Reflex  - Lipid Panel; Future  - ELECTROCARDIOGRAM, COMPLETE: Sinus rhythm at 61 bpm, 1 PVC, no signs for acute ischemia    2. History of Lyme disease  Stable cont watching for symptoms and let me know if we need to retest or treat this again  - Detailed, Mod Complex (16503)    3. Environmental allergies  Stable cont monitoring and management  - Detailed, Mod Complex (11053)  - fluticasone propionate 50 MCG/ACT Nasal Suspension; 2 sprays by Each Nare route daily as needed.  Dispense: 16 g; Refill: 3    4. Essential hypertension  Stable cont monitoring and management  Lets get the ct calcium test done and I will call with results  - Detailed, Mod Complex (30266)  - hydroCHLOROthiazide 12.5 MG Oral Cap; Take 1 capsule (12.5 mg total) by mouth daily.  Dispense: 90 capsule; Refill: 3  - metoprolol succinate ER (TOPROL XL) 25 MG Oral Tablet 24 Hr; Take 1 tablet (25 mg total) by mouth daily.  Dispense: 90 tablet; Refill: 0  - Potassium Chloride ER 10 MEQ Oral Tab CR; Take 2 tablets (20 mEq total) by mouth daily.  Dispense: 180 tablet; Refill: 3  - CT CALCIUM SCORING; Future    5. History of nephrolithiasis  Stable cont monitoring and management  - Detailed, Mod Complex (44493)    6. Hyperthyroidism  Stable cont monitoring and management  I will notify the duly doctor with your thyroid results once completed  - Detailed, Mod Complex (98787)    7. Multiple thyroid nodules  Stable cont monitoring and management  - Detailed, Mod Complex (52268)  - Assay, Thyroid Stim Hormone; Future  - Free T3  (Triiodothryronine); Future  - Free T4, (Free Thyroxine); Future    8. Vitamin D deficiency  Stable cont monitoring and management  - Detailed, Mod Complex (17863)  - Vitamin D; Future    9. Other fatigue  Stable cont monitoring and management  - cyanocobalamin (Vitamin B12) 1000 MCG/ML injection 1,000 mcg

## 2024-06-04 NOTE — PROGRESS NOTES
Subjective:   Lauryn Pelletier is a 83 year old female who presents for a Medicare Subsequent Annual Wellness visit (Pt already had Initial Annual Wellness) and scheduled follow up of multiple significant but stable problems.   Chief Complaint   Patient presents with    Physical     MA    Fatigue     Claims to be tired more often lately      Patient presents for a Medicare Subsequent Annual Wellness visit and  followup regarding chronic medical problems and/or refills as listed below in the assessment and plan    Patient is here today for physical exam, patient is up-to-date with age-related standard of care screening and vaccination recommendations, this was discussed at length and they verbalized understanding of any deficiencies.    Fatigue: Patient claims to be more fatigued lately, having to take naps during the day as she starts to get active, she is compelled to rest more often, and this is a touch concerning to her, she does not describe any chest pain, shortness of breath, or cardiac symptoms.    History/Other:   Fall Risk Assessment:   She has been screened for Falls and is low risk.      Cognitive Assessment:   She had a completely normal cognitive assessment - see flowsheet entries     Functional Ability/Status:   Lauryn Pelletier has a completely normal functional assessment. See flowsheet for details.      Depression Screening (PHQ-2/PHQ-9): PHQ-2 SCORE: 0  , done 6/4/2024             Advanced Directives:   She does have a Living Will but we do NOT have it on file in Epic.    She does NOT have a Power of  for Health Care. [Do you have a healthcare power of ?: No]  Discussed Advance Care Planning with patient (and family/surrogate if present). Standard forms made available to patient in After Visit Summary.      Patient Active Problem List   Diagnosis    History of Lyme disease    History of nephrolithiasis    Hyperthyroidism    Essential hypertension    Multiple thyroid nodules    Vitamin D  deficiency    Environmental allergies     Allergies:  She has No Known Allergies.    Current Medications:  Outpatient Medications Marked as Taking for the 6/4/24 encounter (Office Visit) with D'Amico, Jeff Anthony, DO   Medication Sig    hydroCHLOROthiazide 12.5 MG Oral Cap Take 1 capsule (12.5 mg total) by mouth daily.    metoprolol succinate ER (TOPROL XL) 25 MG Oral Tablet 24 Hr Take 1 tablet (25 mg total) by mouth daily.    Potassium Chloride ER 10 MEQ Oral Tab CR Take 2 tablets (20 mEq total) by mouth daily.    fluticasone propionate 50 MCG/ACT Nasal Suspension 2 sprays by Each Nare route daily as needed.    Calcium Carb-Cholecalciferol (CALCIUM+D3) 500-400 MG-UNIT Oral Tab Take 1 tablet by mouth daily.    Lactobacillus (FLORAJEN ACIDOPHILUS OR) Take 1 capsule by mouth daily.    Flaxseed, Linseed, (FLAX SEED OIL OR) Take 1,000 mg by mouth daily.    Omega-3 Fatty Acids (FISH OIL OR) Take 1,000 mg by mouth daily.    methimazole (TAPAZOLE) 5 MG Oral Tab Take 1 tablet (5 mg total) by mouth daily.       Medical History:  She  has a past medical history of Calculus of kidney (2009 & 2019), Essential hypertension, Lyme disease (2015), and Osteoporosis screening (05/23/2014).  Surgical History:  She  has a past surgical history that includes knee arthroscopy (Left, 01/01/2009); other surgical history; other surgical history (01/01/2016); and cataract (Left, 08/01/2022).   Family History:  Her family history is not on file.  Social History:  She  reports that she has never smoked. She has been exposed to tobacco smoke. She has never used smokeless tobacco. She reports that she does not currently use alcohol. She reports that she does not use drugs.    Tobacco:  She has never smoked tobacco.    CAGE Alcohol Screen:   CAGE Alcohol Screening       6/4/2024     5:08 PM   CAGE Flowsheet   Have you ever felt you should Cut down on your drinking? 0   Have people Annoyed you by criticizing your drinking? 0   Have you ever felt  bad or Guilty about your drinking? 0   Have you ever had a drink first thing in the morning to steady your nerves or to get rid of a hangover (Eye opener)? 0   Total Score: Item responses on the CAGE are scored 0 or 1, with a higher score an indication of alcohol 0   Total Score: Item responses on the CAGE are scored 0 or 1, with a higher score an indication of alcohol No Risk              Patient Care Team:  D'Amico, Jeff Anthony, DO as PCP - General (Internal Medicine)    Review of Systems  Constitutional: Negative for Chills, fatigue, fever, malaise, weight gain and weight loss.  ENMT: Negative for Nasal drainage and sinus pressure.  Eyes: Negative for Vision changes.  Respiratory: Negative for Cough, dyspnea and wheezing.  Cardio: Negative Chest pain and irregular heartbeat/palpitations.  GI: Negative for Abdominal pain, constipation, diarrhea, heartburn, nausea and vomiting.  : Negative for Dysuria and urinary frequency.  Endocrine: Negative for Cold intolerance and heat intolerance.  Neuro: Negative for Gait disturbance and memory impairment.  Psych: Negative for Anxiety and depression.  Integumentary: Negative for Hives and rash.  MS: Negative muscle weakness. pos for joint pain  Hema/Lymph: Negative Easy bleeding and easy bruising.  Allergic/Immuno: Negative Environmental allergies and food allergies.      Objective:   Physical Exam    PHYSICAL EXAMINATION:  Vital signs: See chart   Gen. exam: Alert and oriented, in no acute distress   HEENT: Pupils equal and reactive to light and accommodation, moist mucous membranes  Neck exam:  Supple with baseline range of motion.  Normal thyroid trachea midline, no JVD  Heart exam: Regular rate and rhythm no murmurs no S3 no S4   Lung exam: No rales no rhonchi no wheezes  Abdominal exam: Soft nontender, nondistended positive bowel sounds are normoactive  Extremities exam: no clubbing no cyanosis no edema  Skin exam: see wound notes for details, no rashes  Neurological  exam: Cranial nerves II through XII intact, no gross deficits  Musculoskeletal exam: Moderate bilateral generalized hand arthritis appreciated, no obvious deformity  : deferred to urology    /80 (BP Location: Right arm, Patient Position: Sitting, Cuff Size: adult)   Pulse 70   Temp 97.8 °F (36.6 °C) (Oral)   Ht 5' 2.4\" (1.585 m)   Wt 150 lb 12.8 oz (68.4 kg)   SpO2 97%   BMI 27.23 kg/m²  Estimated body mass index is 27.23 kg/m² as calculated from the following:    Height as of this encounter: 5' 2.4\" (1.585 m).    Weight as of this encounter: 150 lb 12.8 oz (68.4 kg).    Medicare Hearing Assessment:   Hearing Screening    Time taken: 6/4/2024  1:01 PM  Entry User: Kell Soriano RN  Screening Method: Whisper Test         Visual Acuity:   Right Eye Visual Acuity: Uncorrected Right Eye Chart Acuity: 20/30   Left Eye Visual Acuity: Corrected (cataract sx 2 yrs ago) Left Eye Chart Acuity: 20/30   Both Eyes Visual Acuity: Uncorrected Both Eyes Chart Acuity: 20/30   Able To Tolerate Visual Acuity: Yes        Assessment & Plan:   Lauryn Pelletier is a 83 year old female who presents for a Medicare Assessment.     1. Encounter for annual health examination (Primary)  -     CBC With Differential With Platelet; Future; Expected date: 06/04/2024  -     Comp Metabolic Panel (14); Future; Expected date: 06/04/2024  -     Urinalysis with Culture Reflex  -     Lipid Panel; Future; Expected date: 06/04/2024  -     ELECTROCARDIOGRAM, COMPLETE  2. History of Lyme disease  -     Detailed, Mod Complex (18005)  3. Environmental allergies  -     Detailed, Mod Complex (29877)  -     Fluticasone Propionate; 2 sprays by Each Nare route daily as needed.  Dispense: 16 g; Refill: 3  4. Essential hypertension  -     Detailed, Mod Complex (52426)  -     hydroCHLOROthiazide; Take 1 capsule (12.5 mg total) by mouth daily.  Dispense: 90 capsule; Refill: 3  -     Metoprolol Succinate ER; Take 1 tablet (25 mg total) by mouth daily.   Dispense: 90 tablet; Refill: 0  -     Potassium Chloride ER; Take 2 tablets (20 mEq total) by mouth daily.  Dispense: 180 tablet; Refill: 3  -     CT CALCIUM SCORING; Future; Expected date: 06/04/2024  5. History of nephrolithiasis  -     Detailed Mod Complex (99214)  6. Hyperthyroidism  -     Detailed, Mod Complex (99214)  7. Multiple thyroid nodules  -     Detailed, Mod Complex (99214)  -     Assay, Thyroid Stim Hormone; Future; Expected date: 06/04/2024  -     Free T3 (Triiodothryronine); Future; Expected date: 06/04/2024  -     Free T4, (Free Thyroxine); Future; Expected date: 06/04/2024  8. Vitamin D deficiency  -     Detailed Mod Complex (99214)  -     Vitamin D; Future; Expected date: 06/04/2024  9. Other fatigue  -     Cyanocobalamin  1. Encounter for annual health examination  Physical exam instruction: Improve diet and exercise, complete fasting labs in the near future and you will be called with results 5-7 days after completed, call with questions.  Call the central scheduling number at [phone] to schedule at any of the [facility] locations  - CBC With Differential With Platelet; Future  - Comp Metabolic Panel (14); Future  - Urinalysis with Culture Reflex  - Lipid Panel; Future  - ELECTROCARDIOGRAM, COMPLETE: Sinus rhythm at 61 bpm, 1 PVC, no signs for acute ischemia    2. History of Lyme disease  Stable cont watching for symptoms and let me know if we need to retest or treat this again  - Sajan Mod Complex (62477)    3. Environmental allergies  Stable cont monitoring and management  - Sajan Mod Complex (16234)  - fluticasone propionate 50 MCG/ACT Nasal Suspension; 2 sprays by Each Nare route daily as needed.  Dispense: 16 g; Refill: 3    4. Essential hypertension  Stable cont monitoring and management  Lets get the ct calcium test done and I will call with results  - Sajan Mod Complex (08852)  - hydroCHLOROthiazide 12.5 MG Oral Cap; Take 1 capsule (12.5 mg total) by mouth daily.   Dispense: 90 capsule; Refill: 3  - metoprolol succinate ER (TOPROL XL) 25 MG Oral Tablet 24 Hr; Take 1 tablet (25 mg total) by mouth daily.  Dispense: 90 tablet; Refill: 0  - Potassium Chloride ER 10 MEQ Oral Tab CR; Take 2 tablets (20 mEq total) by mouth daily.  Dispense: 180 tablet; Refill: 3  - CT CALCIUM SCORING; Future    5. History of nephrolithiasis  Stable cont monitoring and management  - Detailed, Mod Complex (40596)    6. Hyperthyroidism  Stable cont monitoring and management  I will notify the duly doctor with your thyroid results once completed  - Detailed, Mod Complex (75595)    7. Multiple thyroid nodules  Stable cont monitoring and management  - Detailed, Mod Complex (11722)  - Assay, Thyroid Stim Hormone; Future  - Free T3 (Triiodothryronine); Future  - Free T4, (Free Thyroxine); Future    8. Vitamin D deficiency  Stable cont monitoring and management  - Detailed, Mod Complex (43753)  - Vitamin D; Future    9. Other fatigue  Stable cont monitoring and management  - cyanocobalamin (Vitamin B12) 1000 MCG/ML injection 1,000 mcg    The patient indicates understanding of these issues and agrees to the plan.  Reinforced healthy diet, lifestyle, and exercise.      No follow-ups on file.     Jeff Anthony D'Amico, DO, 6/4/2024     Supplementary Documentation:   General Health:  In the past six months, have you lost more than 10 pounds without trying?: 2 - No  Has your appetite been poor?: No  Type of Diet: Balanced  How does the patient maintain a good energy level?: Daily Walks  How would you describe your daily physical activity?: Moderate  How would you describe your current health state?: Good  How do you maintain positive mental well-being?: Puzzles;Visiting Friends;Visiting Family  On a scale of 0 to 10, with 0 being no pain and 10 being severe pain, what is your pain level?: 0 - (None)  In the past six months, have you experienced urine leakage?: 0-No  At any time do you feel concerned for the  safety/well-being of yourself and/or your children, in your home or elsewhere?: No  Have you had any immunizations at another office such as Influenza, Hepatitis B, Tetanus, or Pneumococcal?: No       Lauryn Pelletier's SCREENING SCHEDULE   Tests on this list are recommended by your physician but may not be covered, or covered at this frequency, by your insurer.   Please check with your insurance carrier before scheduling to verify coverage.   PREVENTATIVE SERVICES FREQUENCY &  COVERAGE DETAILS LAST COMPLETION DATE   Diabetes Screening    Fasting Blood Sugar /  Glucose    One screening every 12 months if never tested or if previously tested but not diagnosed with pre-diabetes   One screening every 6 months if diagnosed with pre-diabetes Lab Results   Component Value Date    GLU 90 06/04/2024        Cardiovascular Disease Screening    Lipid Panel  Cholesterol  Lipoprotein (HDL)  Triglycerides Covered every 5 years for all Medicare beneficiaries without apparent signs or symptoms of cardiovascular disease Lab Results   Component Value Date    CHOLEST 185 06/04/2024    HDL 56 06/04/2024     (H) 06/04/2024    TRIG 105 06/04/2024         Electrocardiogram (EKG)   Covered if needed at Welcome to Medicare, and non-screening if indicated for medical reasons 06/04/2024      Ultrasound Screening for Abdominal Aortic Aneurysm (AAA) Covered once in a lifetime for one of the following risk factors    Men who are 65-75 years old and have ever smoked    Anyone with a family history -     Colorectal Cancer Screening  Covered for ages 50-85; only need ONE of the following:    Colonoscopy   Covered every 10 years    Covered every 2 years if patient is at high risk or previous colonoscopy was abnormal -    No recommendations at this time    Flexible Sigmoidoscopy   Covered every 4 years -    Fecal Occult Blood Test Covered annually -   Bone Density Screening    Bone density screening    Covered every 2 years after age 65 if  diagnosed with risk of osteoporosis or estrogen deficiency.    Covered yearly for long-term glucocorticoid medication use (Steroids) Last Dexa Scan:    XR DEXA BONE DENSITOMETRY (CPT=77080) 05/20/2021      No recommendations at this time   Pap and Pelvic    Pap   Covered every 2 years for women at normal risk; Annually if at high risk -  No recommendations at this time    Chlamydia Annually if high risk -  No recommendations at this time   Screening Mammogram    Mammogram     Recommend annually for all female patients aged 40 and older    One baseline mammogram covered for patients aged 35-39 -    Health Maintenance   Topic Date Due    Mammogram  06/01/2025 (Originally 5/4/2019)       Immunizations    Influenza Covered once per flu season  Please get every year 10/19/2023  No recommendations at this time    Pneumococcal Each vaccine ([id number] & [id number]) covered once after 65 Prevnar 13: 04/23/2015    Onklpanti66: 11/01/2011     No recommendations at this time    Hepatitis B One screening covered for patients with certain risk factors   -  No recommendations at this time    Tetanus Toxoid Not covered by Medicare Part B unless medically necessary (cut with metal); may be covered with your pharmacy prescription benefits -    Tetanus, Diptheria and Pertusis TD and TDaP Not covered by Medicare Part B -  No recommendations at this time    Zoster Not covered by Medicare Part B; may be covered with your pharmacy  prescription benefits 12/12/2012  No recommendations at this time     Annual Monitoring of Persistent Medications (ACE/ARB, digoxin diuretics, anticonvulsants)    Potassium Annually Lab Results   Component Value Date    K 4.0 06/04/2024         Creatinine   Annually Lab Results   Component Value Date    CREATSERUM 0.88 06/04/2024         BUN Annually Lab Results   Component Value Date    BUN 17 06/04/2024       Drug Serum Conc Annually No results found for: \"DIGOXIN\", \"DIG\", \"VALP\"

## 2024-06-05 ENCOUNTER — TELEPHONE (OUTPATIENT)
Dept: INTERNAL MEDICINE CLINIC | Facility: CLINIC | Age: 84
End: 2024-06-05

## 2024-06-05 RX ORDER — NITROFURANTOIN 25; 75 MG/1; MG/1
100 CAPSULE ORAL 2 TIMES DAILY
Qty: 14 CAPSULE | Refills: 0 | Status: SHIPPED | OUTPATIENT
Start: 2024-06-05

## 2024-06-05 NOTE — TELEPHONE ENCOUNTER
To Dr. CHILDRESS:  Please advise in absence of Dr. D'Amico.  Spoke with patient regarding urine culture results.    At this times she does not have any UTI symptoms. States in the past she has had UTIs without having any symptoms.   (Denies pain/burning while urinating, increased frequency or urgency, urinary retention, blood in urine, pressure/cramping/bloating in the groin/lower abdomen/bladder, cloudy urine, change in odor of urine, lower back/flank pain, or fevers/chills/night sweats.)     Pt states when she gets UTIs they usually go \"straight to her kidneys\" so she'd like to start antibiotics.     She states in the past, she has taken Macrobid for UTIs.       [facility]           Urine Culture, Routine  Status: Preliminary result    URINE CULTURE >100,000 CFU/ML Gram Negative Jonathan Abnormal       10,000 - 50,000 CFU/ML Gram Negative Jonathan Abnormal

## 2024-06-06 NOTE — TELEPHONE ENCOUNTER
UA 6-10 WBC  UCx >100K gram neg jose    Imp-UTI    Rec- Macrobid 100 mg po BID #14    ERx sent; pt called; ANITHA GILES

## 2024-10-19 DIAGNOSIS — Z91.09 ENVIRONMENTAL ALLERGIES: ICD-10-CM

## 2024-10-19 DIAGNOSIS — I10 ESSENTIAL HYPERTENSION: ICD-10-CM

## 2024-10-21 DIAGNOSIS — I10 ESSENTIAL HYPERTENSION: ICD-10-CM

## 2024-10-21 RX ORDER — POTASSIUM CHLORIDE 750 MG/1
20 TABLET, EXTENDED RELEASE ORAL DAILY
Qty: 180 TABLET | Refills: 3 | OUTPATIENT
Start: 2024-10-21

## 2024-10-21 RX ORDER — METOPROLOL SUCCINATE 25 MG/1
25 TABLET, EXTENDED RELEASE ORAL DAILY
Qty: 90 TABLET | Refills: 3 | Status: SHIPPED | OUTPATIENT
Start: 2024-10-21

## 2024-10-21 RX ORDER — FLUTICASONE PROPIONATE 50 MCG
2 SPRAY, SUSPENSION (ML) NASAL DAILY
Qty: 48 G | Refills: 3 | OUTPATIENT
Start: 2024-10-21

## 2024-10-21 NOTE — TELEPHONE ENCOUNTER
Refill request is for a maintenance medication and has met the criteria specified in the Ambulatory Medication Refill Standing Order for eligibility, visits, laboratory, alerts and was sent to the requested pharmacy.    Requested Prescriptions     Signed Prescriptions Disp Refills    metoprolol succinate ER (TOPROL XL) 25 MG Oral Tablet 24 Hr 90 tablet 3     Sig: Take 1 tablet (25 mg total) by mouth daily.     Authorizing Provider: D'AMICO, JEFF ANTHONY     Ordering User: CJ BARROSO

## 2024-10-21 NOTE — TELEPHONE ENCOUNTER
Potassium & Fluticasone 1 year ordered on 6/4/2024.  Current refill request refused due to refill is either a duplicate request or has active refills at the pharmacy.  Check previous templates.    Requested Prescriptions     Refused Prescriptions Disp Refills    POTASSIUM CHLORIDE 10 MEQ Oral Tab CR [Pharmacy Med Name: POTASSIUM CHLORIDE ER (DISP) TABS 10MEQ] 180 tablet 3     Sig: TAKE 2 TABLETS DAILY     Refused By: CJ BARROSO     Reason for Refusal: Refill not appropriate    FLUTICASONE PROPIONATE 50 MCG/ACT Nasal Suspension [Pharmacy Med Name: FLUTICASONE PROPIONATE NASAL SPRAY 50MCG] 48 g 3     Sig: USE 2 SPRAYS IN EACH NOSTRIL DAILY AS NEEDED     Refused By: CJ BARROSO     Reason for Refusal: Refill not appropriate

## 2025-01-13 DIAGNOSIS — Z91.09 ENVIRONMENTAL ALLERGIES: ICD-10-CM

## 2025-01-13 RX ORDER — FLUTICASONE PROPIONATE 50 MCG
2 SPRAY, SUSPENSION (ML) NASAL DAILY
Qty: 3 EACH | Refills: 3 | Status: SHIPPED | OUTPATIENT
Start: 2025-01-13

## 2025-01-13 NOTE — TELEPHONE ENCOUNTER
Refill request is for a maintenance medication and has met the criteria specified in the Ambulatory Medication Refill Standing Order for eligibility, visits, laboratory, alerts and was sent to the requested pharmacy.    Requested Prescriptions     Signed Prescriptions Disp Refills    fluticasone propionate 50 MCG/ACT Nasal Suspension 3 each 3     Sig: USE 2 SPRAYS IN EACH NOSTRIL DAILY AS NEEDED     Authorizing Provider: D'AMICO, JEFF ANTHONY     Ordering User: RACHELLE ENRIQUE

## 2025-05-27 ENCOUNTER — LAB ENCOUNTER (OUTPATIENT)
Dept: LAB | Age: 85
End: 2025-05-27
Attending: INTERNAL MEDICINE
Payer: MEDICARE

## 2025-05-27 DIAGNOSIS — E05.90 T3 THYROTOXICOSIS: Primary | ICD-10-CM

## 2025-05-27 DIAGNOSIS — E04.2 MULTINODULAR THYROID: ICD-10-CM

## 2025-05-27 LAB
T3FREE SERPL-MCNC: 2.81 PG/ML (ref 2.4–4.2)
T4 FREE SERPL-MCNC: 1.1 NG/DL (ref 0.8–1.7)
TSI SER-ACNC: 2.32 UIU/ML (ref 0.55–4.78)

## 2025-05-27 PROCEDURE — 84439 ASSAY OF FREE THYROXINE: CPT

## 2025-05-27 PROCEDURE — 84443 ASSAY THYROID STIM HORMONE: CPT

## 2025-05-27 PROCEDURE — 36415 COLL VENOUS BLD VENIPUNCTURE: CPT

## 2025-05-27 PROCEDURE — 84481 FREE ASSAY (FT-3): CPT

## 2025-05-28 ENCOUNTER — TELEPHONE (OUTPATIENT)
Dept: INTERNAL MEDICINE CLINIC | Facility: CLINIC | Age: 85
End: 2025-05-28

## 2025-05-28 DIAGNOSIS — Z00.00 ENCOUNTER FOR ANNUAL HEALTH EXAMINATION: Primary | ICD-10-CM

## 2025-05-28 DIAGNOSIS — E55.9 VITAMIN D DEFICIENCY: ICD-10-CM

## 2025-06-04 NOTE — TELEPHONE ENCOUNTER
Okay to use MD approval in the upcoming weeks for this patient to schedule, spots should be opening up very soon,  please reach out

## 2025-06-04 NOTE — TELEPHONE ENCOUNTER
Called pt. And scheduled her MA on 6/16/25.  Pt. Requesting lab orders.      Patient calling to request blood work prior to:    [x] physical    [] check-up      [] other    Patient uses:  [x[facility]       [facility] location:       Fax #:    Patient prefers to be notified once labs are placed by: [x] phone call  [] my chart message

## 2025-06-05 NOTE — TELEPHONE ENCOUNTER
Left detailed voicemail ( ok per HIPAA ) relaying MD message. Advised to call back with any questions or concerns

## 2025-06-05 NOTE — TELEPHONE ENCOUNTER
Patient returned our call.     Advised patient nurse left detailed message relaying MD message.     Patient stated she would listen to voicemail and call with any questions.

## 2025-06-10 ENCOUNTER — LAB ENCOUNTER (OUTPATIENT)
Dept: LAB | Age: 85
End: 2025-06-10
Attending: INTERNAL MEDICINE
Payer: MEDICARE

## 2025-06-10 LAB
ALBUMIN SERPL-MCNC: 4.3 G/DL (ref 3.2–4.8)
ALBUMIN/GLOB SERPL: 1.7 {RATIO} (ref 1–2)
ALP LIVER SERPL-CCNC: 76 U/L (ref 55–142)
ALT SERPL-CCNC: <7 U/L (ref 10–49)
ANION GAP SERPL CALC-SCNC: 11 MMOL/L (ref 0–18)
AST SERPL-CCNC: 15 U/L (ref ?–34)
BASOPHILS # BLD AUTO: 0.03 X10(3) UL (ref 0–0.2)
BASOPHILS NFR BLD AUTO: 0.5 %
BILIRUB SERPL-MCNC: 0.6 MG/DL (ref 0.2–1.1)
BILIRUB UR QL: NEGATIVE
BUN BLD-MCNC: 25 MG/DL (ref 9–23)
BUN/CREAT SERPL: 26.3 (ref 10–20)
CALCIUM BLD-MCNC: 10 MG/DL (ref 8.7–10.4)
CHLORIDE SERPL-SCNC: 106 MMOL/L (ref 98–112)
CHOLEST SERPL-MCNC: 147 MG/DL (ref ?–200)
CLARITY UR: CLEAR
CO2 SERPL-SCNC: 26 MMOL/L (ref 21–32)
COLOR UR: YELLOW
CREAT BLD-MCNC: 0.95 MG/DL (ref 0.55–1.02)
DEPRECATED RDW RBC AUTO: 46.6 FL (ref 35.1–46.3)
EGFRCR SERPLBLD CKD-EPI 2021: 59 ML/MIN/1.73M2 (ref 60–?)
EOSINOPHIL # BLD AUTO: 0.15 X10(3) UL (ref 0–0.7)
EOSINOPHIL NFR BLD AUTO: 2.7 %
ERYTHROCYTE [DISTWIDTH] IN BLOOD BY AUTOMATED COUNT: 13.5 % (ref 11–15)
FASTING PATIENT LIPID ANSWER: YES
FASTING STATUS PATIENT QL REPORTED: YES
GLOBULIN PLAS-MCNC: 2.6 G/DL (ref 2–3.5)
GLUCOSE BLD-MCNC: 102 MG/DL (ref 70–99)
GLUCOSE UR-MCNC: NORMAL MG/DL
HCT VFR BLD AUTO: 43.3 % (ref 35–48)
HDLC SERPL-MCNC: 49 MG/DL (ref 40–59)
HGB BLD-MCNC: 13.8 G/DL (ref 12–16)
HGB UR QL STRIP.AUTO: NEGATIVE
IMM GRANULOCYTES # BLD AUTO: 0.02 X10(3) UL (ref 0–1)
IMM GRANULOCYTES NFR BLD: 0.4 %
KETONES UR-MCNC: NEGATIVE MG/DL
LDLC SERPL CALC-MCNC: 82 MG/DL (ref ?–100)
LEUKOCYTE ESTERASE UR QL STRIP.AUTO: 75
LYMPHOCYTES # BLD AUTO: 1.09 X10(3) UL (ref 1–4)
LYMPHOCYTES NFR BLD AUTO: 19.9 %
MCH RBC QN AUTO: 29.6 PG (ref 26–34)
MCHC RBC AUTO-ENTMCNC: 31.9 G/DL (ref 31–37)
MCV RBC AUTO: 92.7 FL (ref 80–100)
MONOCYTES # BLD AUTO: 0.36 X10(3) UL (ref 0.1–1)
MONOCYTES NFR BLD AUTO: 6.6 %
NEUTROPHILS # BLD AUTO: 3.84 X10 (3) UL (ref 1.5–7.7)
NEUTROPHILS # BLD AUTO: 3.84 X10(3) UL (ref 1.5–7.7)
NEUTROPHILS NFR BLD AUTO: 69.9 %
NONHDLC SERPL-MCNC: 98 MG/DL (ref ?–130)
OSMOLALITY SERPL CALC.SUM OF ELEC: 301 MOSM/KG (ref 275–295)
PH UR: 6 [PH] (ref 5–8)
PLATELET # BLD AUTO: 236 10(3)UL (ref 150–450)
POTASSIUM SERPL-SCNC: 4.1 MMOL/L (ref 3.5–5.1)
PROT SERPL-MCNC: 6.9 G/DL (ref 5.7–8.2)
PROT UR-MCNC: NEGATIVE MG/DL
RBC # BLD AUTO: 4.67 X10(6)UL (ref 3.8–5.3)
SODIUM SERPL-SCNC: 143 MMOL/L (ref 136–145)
SP GR UR STRIP: 1.02 (ref 1–1.03)
TRIGL SERPL-MCNC: 84 MG/DL (ref 30–149)
TSI SER-ACNC: 1.59 UIU/ML (ref 0.55–4.78)
UROBILINOGEN UR STRIP-ACNC: NORMAL
VIT D+METAB SERPL-MCNC: 88 NG/ML (ref 30–100)
VLDLC SERPL CALC-MCNC: 13 MG/DL (ref 0–30)
WBC # BLD AUTO: 5.5 X10(3) UL (ref 4–11)

## 2025-06-10 PROCEDURE — 80061 LIPID PANEL: CPT | Performed by: INTERNAL MEDICINE

## 2025-06-10 PROCEDURE — 82306 VITAMIN D 25 HYDROXY: CPT | Performed by: INTERNAL MEDICINE

## 2025-06-10 PROCEDURE — 87086 URINE CULTURE/COLONY COUNT: CPT | Performed by: INTERNAL MEDICINE

## 2025-06-10 PROCEDURE — 84443 ASSAY THYROID STIM HORMONE: CPT | Performed by: INTERNAL MEDICINE

## 2025-06-10 PROCEDURE — 36415 COLL VENOUS BLD VENIPUNCTURE: CPT | Performed by: INTERNAL MEDICINE

## 2025-06-10 PROCEDURE — 87186 SC STD MICRODIL/AGAR DIL: CPT | Performed by: INTERNAL MEDICINE

## 2025-06-10 PROCEDURE — 81001 URINALYSIS AUTO W/SCOPE: CPT | Performed by: INTERNAL MEDICINE

## 2025-06-10 PROCEDURE — 80053 COMPREHEN METABOLIC PANEL: CPT | Performed by: INTERNAL MEDICINE

## 2025-06-10 PROCEDURE — 87077 CULTURE AEROBIC IDENTIFY: CPT | Performed by: INTERNAL MEDICINE

## 2025-06-10 PROCEDURE — 85025 COMPLETE CBC W/AUTO DIFF WBC: CPT | Performed by: INTERNAL MEDICINE

## 2025-06-11 ENCOUNTER — TELEPHONE (OUTPATIENT)
Dept: INTERNAL MEDICINE CLINIC | Facility: CLINIC | Age: 85
End: 2025-06-11

## 2025-06-11 RX ORDER — NITROFURANTOIN 25; 75 MG/1; MG/1
100 CAPSULE ORAL 2 TIMES DAILY
Qty: 10 CAPSULE | Refills: 0 | Status: SHIPPED | OUTPATIENT
Start: 2025-06-11 | End: 2025-06-16 | Stop reason: ALTCHOICE

## 2025-06-11 NOTE — TELEPHONE ENCOUNTER
Called patient and relayed DR. Lomax message - verbalized understanding  RX sent per written order

## 2025-06-11 NOTE — TELEPHONE ENCOUNTER
UTI symptoms:    [x]Frequency  []Urgency  []Pain/burning  []Blood in urine  []Low back pain  []Flank pain  []Fevers/chills  []Odor  []Confusion    NOTES:  Called patient who started with frequency a month ago

## 2025-06-13 ENCOUNTER — TELEPHONE (OUTPATIENT)
Dept: INTERNAL MEDICINE CLINIC | Facility: CLINIC | Age: 85
End: 2025-06-13

## 2025-06-13 NOTE — TELEPHONE ENCOUNTER
----- Message from Jeff Anthony D'Amico sent at 6/12/2025  6:58 PM CDT -----  Nursing if you can reach out to her, let her know the antibiotic sent in for her should work for the mild infection.  I am not too concerned about it but I do want her to complete all the antibiotics   and follow-up with me as planned.-dr damico  ----- Message -----  From: Lab, Background User  Sent: 6/10/2025   2:01 PM CDT  To: Jeff Anthony D'Amico, DO

## 2025-06-16 ENCOUNTER — OFFICE VISIT (OUTPATIENT)
Dept: INTERNAL MEDICINE CLINIC | Facility: CLINIC | Age: 85
End: 2025-06-16

## 2025-06-16 VITALS
DIASTOLIC BLOOD PRESSURE: 70 MMHG | SYSTOLIC BLOOD PRESSURE: 134 MMHG | HEIGHT: 62.4 IN | TEMPERATURE: 99 F | OXYGEN SATURATION: 98 % | BODY MASS INDEX: 27.07 KG/M2 | HEART RATE: 70 BPM | WEIGHT: 149 LBS

## 2025-06-16 DIAGNOSIS — Z00.00 PHYSICAL EXAM: Primary | ICD-10-CM

## 2025-06-16 DIAGNOSIS — M16.11 PRIMARY OSTEOARTHRITIS OF RIGHT HIP: ICD-10-CM

## 2025-06-16 DIAGNOSIS — E55.9 VITAMIN D DEFICIENCY: ICD-10-CM

## 2025-06-16 DIAGNOSIS — Z87.442 HISTORY OF NEPHROLITHIASIS: ICD-10-CM

## 2025-06-16 DIAGNOSIS — N30.00 ACUTE CYSTITIS WITHOUT HEMATURIA: ICD-10-CM

## 2025-06-16 DIAGNOSIS — Z91.09 ENVIRONMENTAL ALLERGIES: ICD-10-CM

## 2025-06-16 DIAGNOSIS — E04.2 MULTIPLE THYROID NODULES: ICD-10-CM

## 2025-06-16 DIAGNOSIS — E05.90 HYPERTHYROIDISM: ICD-10-CM

## 2025-06-16 DIAGNOSIS — Z86.19 HISTORY OF LYME DISEASE: ICD-10-CM

## 2025-06-16 DIAGNOSIS — I10 ESSENTIAL HYPERTENSION: ICD-10-CM

## 2025-06-16 LAB
ATRIAL RATE: 66 BPM
P AXIS: 41 DEGREES
P-R INTERVAL: 166 MS
Q-T INTERVAL: 426 MS
QRS DURATION: 78 MS
QTC CALCULATION (BEZET): 446 MS
R AXIS: -4 DEGREES
T AXIS: 20 DEGREES
VENTRICULAR RATE: 66 BPM

## 2025-06-16 RX ORDER — METOPROLOL SUCCINATE 25 MG/1
25 TABLET, EXTENDED RELEASE ORAL DAILY
Qty: 90 TABLET | Refills: 3 | Status: SHIPPED | OUTPATIENT
Start: 2025-06-16

## 2025-06-16 RX ORDER — HYDROCHLOROTHIAZIDE 12.5 MG/1
12.5 CAPSULE ORAL DAILY
Qty: 90 CAPSULE | Refills: 3 | Status: SHIPPED | OUTPATIENT
Start: 2025-06-16

## 2025-06-16 RX ORDER — POTASSIUM CHLORIDE 750 MG/1
20 TABLET, EXTENDED RELEASE ORAL DAILY
Qty: 180 TABLET | Refills: 3 | Status: SHIPPED | OUTPATIENT
Start: 2025-06-16

## 2025-06-16 RX ORDER — MELOXICAM 7.5 MG/1
7.5 TABLET ORAL DAILY
COMMUNITY
Start: 2025-06-10

## 2025-06-16 NOTE — PATIENT INSTRUCTIONS
1. Physical exam  Physical exam instruction: Improve diet and exercise  - ELECTROCARDIOGRAM, COMPLETE: Sinus rhythm at 66 beats minute, no ST-T wave changes, normal EKG.    2. Hyperthyroidism  Stable cont with dr pinzon.  I like the idea of continuing the current suppressive medications here no changes    3. Essential hypertension  Lets continue current medications and I like the idea of you thinking about the CT calcium scoring testing, either getting that done here, or with your own facility, and I will notify you with results once completed  - CT CALCIUM SCORING; Future    4. Environmental allergies  Stable, continue current monitor management, home medications    5. Multiple thyroid nodules  Stable, continue current monitor management, home medications    6. Vitamin D deficiency  Stable, continue current monitor management, home medications    7. History of nephrolithiasis  Stable, continue current monitor management, home medications    8. History of Lyme disease  Resolved    9.  UTI  Complete antibiotics, call with any recurrence.

## 2025-06-16 NOTE — PROGRESS NOTES
Subjective:   Lauryn Pelletier is a 84 year old female who presents for a Medicare Wellness Visit charge within the last 11 months and Patient may not meet criteria for AWV: Please evaluate for correct coding and scheduled follow up of multiple significant but stable problems.             Chief Complaint   Patient presents with    Physical     Annual Medicare Px      Patient presents for a Medicare Subsequent Annual Wellness visit and  followup regarding chronic medical problems and/or refills as listed below in the assessment and plan    Patient is here today for physical exam, patient is up-to-date with age-related standard of care screening and vaccination recommendations, this was discussed at length and they verbalized understanding of any deficiencies.    Hypertension: Patient seems to be stable on current medications, metoprolol hydrochlorothiazide, potassium as well, claims that she has been very stable on these medications and doing very well.  She does follow with her thyroid doctors, and still stays active, she claims her right hip has been bothering her, she is recently seen the orthopedic surgeon    Right hip pain: Patient has had a about 6 months of right hip pain in which she presented to Dr. Anderson, had a workup, and x-rays, and was told she has mild arthritis, she has been given some physical therapy, meloxicam, and seems to do very well with this, claims her pain is much improved.    Hyperthyroidism: Patient has a long history of hyperthyroidism, well-controlled on methimazole and beta-blocker, she continues to follow with Dr. Charles from endocrinology, and continues to undergo her bone density testing, usual screening with the endocrinologist, she feels very comfortable with this plan, and is asymptomatic at this time.    History/Other:   Fall Risk Assessment:   She has been screened for Falls and is low risk.      Cognitive Assessment:   She had a completely normal cognitive assessment - see  flowsheet entries     Functional Ability/Status:   Lauryn Pelletier has a completely normal functional assessment. See flowsheet for details.      Depression Screening (PHQ):  PHQ-2 SCORE: 0  , done 6/16/2025   Last [facility] Suicide Screening on 6/16/2025 was No Risk.          Advanced Directives:   She does have a Living Will but we do NOT have it on file in Epic.    She does NOT have a Power of  for Health Care. [Do you have a healthcare power of ?: No]  Discussed Advance Care Planning with patient (and family/surrogate if present). Standard forms made available to patient in After Visit Summary.      Problem List[1]  Allergies:  She has no known allergies.    Current Medications:  Active Meds, Sig Only[2]    Medical History:  She  has a past medical history of Calculus of kidney (2009 & 2019), Essential hypertension, Lyme disease (2015), and Osteoporosis screening (05/23/2014).  Surgical History:  She  has a past surgical history that includes knee arthroscopy (Left, 01/01/2009); other surgical history; other surgical history (01/01/2016); and cataract (Left, 08/01/2022).   Family History:  Her family history is not on file.  Social History:  She  reports that she has never smoked. She has been exposed to tobacco smoke. She has never used smokeless tobacco. She reports that she does not currently use alcohol. She reports that she does not use drugs.    Tobacco:  She has never smoked tobacco.    CAGE Alcohol Screen:   CAGE screening score of 0 on 6/16/2025, showing low risk of alcohol abuse.      Patient Care Team:  D'Amico, Jeff Anthony, DO as PCP - General (Internal Medicine)    Review of Systems  Constitutional: Negative for Chills, fatigue, fever, malaise, weight gain and weight loss.  ENMT: Negative for Nasal drainage and sinus pressure.  Eyes: Negative for Vision changes.  Respiratory: Negative for Cough, dyspnea and wheezing.  Cardio: Negative Chest pain and irregular  heartbeat/palpitations.  GI: Negative for Abdominal pain, constipation, diarrhea, heartburn, nausea and vomiting.  : Negative for Dysuria and urinary frequency.  Endocrine: Negative for Cold intolerance and heat intolerance.  Neuro: Negative for Gait disturbance and memory impairment.  Psych: Negative for Anxiety and depression.  Integumentary: Negative for Hives and rash.  MS: Negative muscle weakness. pos for joint pain  Hema/Lymph: Negative Easy bleeding and easy bruising.  Allergic/Immuno: Negative Environmental allergies and food allergies.        Objective:   Physical Exam    PHYSICAL EXAMINATION:  Vital signs: See chart   Gen. exam: Alert and oriented, in no acute distress   HEENT: Pupils equal and reactive to light and accommodation, moist mucous membranes  Neck exam:  Supple with baseline range of motion.  Normal thyroid trachea midline, no JVD  Heart exam: Regular rate and rhythm no murmurs no S3 no S4   Lung exam: No rales no rhonchi no wheezes  Abdominal exam: Soft nontender, nondistended positive bowel sounds are normoactive  Extremities exam: no clubbing no cyanosis no edema  Skin exam: see wound notes for details, no rashes  Neurological exam: Cranial nerves II through XII intact, no gross deficits  Musculoskeletal exam: Mild bilateral generalized hand arthritis appreciated, no obvious deformity  : deferred to urology    /70   Pulse 70   Temp 98.7 °F (37.1 °C)   Ht 5' 2.4\" (1.585 m)   Wt 149 lb (67.6 kg)   SpO2 98%   BMI 26.90 kg/m²  Estimated body mass index is 26.9 kg/m² as calculated from the following:    Height as of this encounter: 5' 2.4\" (1.585 m).    Weight as of this encounter: 149 lb (67.6 kg).    Medicare Hearing Assessment:   Hearing Screening    Screening Method: Whisper Test  Whisper Test Result: Pass               Assessment & Plan:   Lauryn Pelletier is a 84 year old female who presents for a Medicare Assessment.     1. Physical exam (Primary)  -     ELECTROCARDIOGRAM,  COMPLETE  2. Hyperthyroidism  -     Detailed, Mod Complex (54702)  3. Essential hypertension  -     CT CALCIUM SCORING; Future; Expected date: 06/16/2025  -     Detailed, Mod Complex (75278)  4. Environmental allergies  -     Detailed, Mod Complex (89426)  5. Multiple thyroid nodules  -     Detailed, Mod Complex (62457)  6. Vitamin D deficiency  -     Detailed, Mod Complex (21752)  7. History of nephrolithiasis  -     Detailed, Mod Complex (09153)  8. History of Lyme disease  -     Detailed, Mod Complex (45557)  9. Acute cystitis without hematuria  -     Detailed, Mod Complex (69390)  10. Primary osteoarthritis of right hip          1. Physical exam  Physical exam instruction: Improve diet and exercise  - ELECTROCARDIOGRAM, COMPLETE: Sinus rhythm at 66 beats minute, no ST-T wave changes, normal EKG.    2. Hyperthyroidism  Stable cont with dr pinzon.  I like the idea of continuing the current suppressive medications here no changes    3. Essential hypertension  Lets continue current medications and I like the idea of you thinking about the CT calcium scoring testing, either getting that done here, or with your own facility, and I will notify you with results once completed  - CT CALCIUM SCORING; Future    4. Environmental allergies  Stable, continue current monitor management, home medications    5. Multiple thyroid nodules  Stable, continue current monitor management, home medications    6. Vitamin D deficiency  Stable, continue current monitor management, home medications    7. History of nephrolithiasis  Stable, continue current monitor management, home medications    8. History of Lyme disease  Resolved    9.  UTI  Complete antibiotics, call with any recurrence.    The patient indicates understanding of these issues and agrees to the plan.  Reinforced healthy diet, lifestyle, and exercise.          No follow-ups on file.     Jeff Anthony D'Amico, DO, 6/16/2025     Supplementary Documentation:   General Health:  In  the past six months, have you lost more than 10 pounds without trying?: 2 - No  Has your appetite been poor?: No  Type of Diet: Balanced  How does the patient maintain a good energy level?: Daily Walks  How would you describe your daily physical activity?: Moderate  How would you describe your current health state?: Good  How do you maintain positive mental well-being?: Social Interaction, Visiting Friends, Visiting Family  On a scale of 0 to 10, with 0 being no pain and 10 being severe pain, what is your pain level?: 1 - (Mild)  In the past six months, have you experienced urine leakage?: 0-No  At any time do you feel concerned for the safety/well-being of yourself and/or your children, in your home or elsewhere?: No  Have you had any immunizations at another office such as Influenza, Hepatitis B, Tetanus, or Pneumococcal?: No    Health Maintenance   Topic Date Due    Mammogram  05/04/2019    Zoster Vaccines (3 of 3) 01/12/2022    COVID-19 Vaccine (4 - 2024-25 season) 09/01/2024    Influenza Vaccine (Season Ended) 10/01/2025    Annual Physical  06/16/2026    DEXA Scan  Completed    Annual Depression Screening  Completed    Fall Risk Screening (Annual)  Completed    Pneumococcal Vaccine: 50+ Years  Completed    Meningococcal B Vaccine  Aged Out            [1]   Patient Active Problem List  Diagnosis    History of Lyme disease    History of nephrolithiasis    Hyperthyroidism    Essential hypertension    Multiple thyroid nodules    Vitamin D deficiency    Environmental allergies    Primary osteoarthritis of right hip   [2]   Outpatient Medications Marked as Taking for the 6/16/25 encounter (Office Visit) with D'Amico, Jeff Anthony, DO   Medication Sig    Meloxicam 7.5 MG Oral Tab Take 1 tablet (7.5 mg total) by mouth daily.    fluticasone propionate 50 MCG/ACT Nasal Suspension USE 2 SPRAYS IN EACH NOSTRIL DAILY AS NEEDED    Potassium Chloride ER 10 MEQ Oral Tab CR Take 2 tablets (20 mEq total) by mouth daily.     metoprolol succinate ER (TOPROL XL) 25 MG Oral Tablet 24 Hr Take 1 tablet (25 mg total) by mouth daily.    hydroCHLOROthiazide 12.5 MG Oral Cap Take 1 capsule (12.5 mg total) by mouth daily.    Calcium Carb-Cholecalciferol (CALCIUM+D3) 500-400 MG-UNIT Oral Tab Take 1 tablet by mouth daily.    Lactobacillus (FLORAJEN ACIDOPHILUS OR) Take 1 capsule by mouth daily.    Flaxseed, Linseed, (FLAX SEED OIL OR) Take 1,000 mg by mouth daily.    Omega-3 Fatty Acids (FISH OIL OR) Take 1,000 mg by mouth daily.    methimazole (TAPAZOLE) 5 MG Oral Tab Take 1 tablet (5 mg total) by mouth daily.

## 2025-07-07 ENCOUNTER — HOSPITAL ENCOUNTER (OUTPATIENT)
Dept: CT IMAGING | Age: 85
Discharge: HOME | End: 2025-07-07
Attending: INTERNAL MEDICINE

## 2025-07-07 DIAGNOSIS — I10 ESSENTIAL HYPERTENSION: ICD-10-CM

## 2025-07-16 ENCOUNTER — RESULTS FOLLOW-UP (OUTPATIENT)
Dept: INTERNAL MEDICINE CLINIC | Facility: CLINIC | Age: 85
End: 2025-07-16

## 2025-07-20 DIAGNOSIS — R93.89 ABNORMAL CT OF THE CHEST: ICD-10-CM

## 2025-07-20 DIAGNOSIS — I25.10 CORONARY ARTERY CALCIFICATION: Primary | ICD-10-CM

## 2025-07-21 NOTE — TELEPHONE ENCOUNTER
Patient does not have mychart. Relayed results over phone and mailed report to home address per patient request.

## (undated) DIAGNOSIS — J01.11 ACUTE RECURRENT FRONTAL SINUSITIS: ICD-10-CM

## (undated) NOTE — MR AVS SNAPSHOT
[facility] [address] [address]  [phone]               Thank you for choosing us for your health care visit with Michel John DO. We are glad to serve you and happy to provide you with this summary of your visit. - Metoprolol Succinate ER 25 MG Oral Tablet 24 Hr; Take 0.5 tablets (12.5 mg total) by mouth daily. Dispense: 45 tablet; Refill: 3    9.  Ingrown hair  Take the antibiotics for 7 days, expect resolution, you will have a lump here in the area for some time Current Medications          This list is accurate as of: 2/28/17  1:48 PM.  Always use your most recent med list.                aspirin 81 MG Tabs   Take 81 mg by mouth daily.            Cefadroxil 500 MG Caps   Take 1 capsule (500 mg total) by mouth 2 (t Please consider the following Lifestyle Modifications. Also, please return for a follow-up Blood Pressure Check in 1 year.      Lifestyle Modification Recommendations:    Modification Recommendation   Weight Reduction Maintain normal body weight (body mass